# Patient Record
Sex: MALE | Race: WHITE | Employment: UNEMPLOYED | URBAN - METROPOLITAN AREA
[De-identification: names, ages, dates, MRNs, and addresses within clinical notes are randomized per-mention and may not be internally consistent; named-entity substitution may affect disease eponyms.]

---

## 2019-01-22 ENCOUNTER — APPOINTMENT (OUTPATIENT)
Dept: GENERAL RADIOLOGY | Age: 44
End: 2019-01-22
Payer: COMMERCIAL

## 2019-01-22 ENCOUNTER — HOSPITAL ENCOUNTER (EMERGENCY)
Age: 44
Discharge: HOME | End: 2019-01-22
Attending: EMERGENCY MEDICINE
Payer: COMMERCIAL

## 2019-01-22 VITALS
HEART RATE: 82 BPM | RESPIRATION RATE: 12 BRPM | HEIGHT: 72 IN | TEMPERATURE: 97.9 F | SYSTOLIC BLOOD PRESSURE: 136 MMHG | WEIGHT: 180 LBS | OXYGEN SATURATION: 100 % | DIASTOLIC BLOOD PRESSURE: 78 MMHG | BODY MASS INDEX: 24.38 KG/M2

## 2019-01-22 DIAGNOSIS — S93.402A SPRAIN OF LEFT ANKLE, UNSPECIFIED LIGAMENT, INITIAL ENCOUNTER: Primary | ICD-10-CM

## 2019-01-22 PROCEDURE — 73610 X-RAY EXAM OF ANKLE: CPT

## 2019-01-22 PROCEDURE — 99283 EMERGENCY DEPT VISIT LOW MDM: CPT

## 2019-01-22 RX ORDER — IBUPROFEN 600 MG/1
600 TABLET ORAL EVERY 6 HOURS PRN
Qty: 40 TABLET | Refills: 0 | Status: SHIPPED | OUTPATIENT
Start: 2019-01-22 | End: 2021-07-27

## 2019-01-22 ASSESSMENT — PAIN DESCRIPTION - DESCRIPTORS: DESCRIPTORS: DISCOMFORT

## 2019-01-22 ASSESSMENT — PAIN DESCRIPTION - ORIENTATION: ORIENTATION: LEFT

## 2019-01-22 ASSESSMENT — PAIN DESCRIPTION - PAIN TYPE: TYPE: ACUTE PAIN;CHRONIC PAIN

## 2019-01-22 ASSESSMENT — PAIN DESCRIPTION - LOCATION: LOCATION: ANKLE

## 2019-09-11 ENCOUNTER — HOSPITAL ENCOUNTER (EMERGENCY)
Age: 44
Discharge: HOME | End: 2019-09-11
Attending: EMERGENCY MEDICINE
Payer: COMMERCIAL

## 2019-09-11 VITALS
WEIGHT: 185.4 LBS | SYSTOLIC BLOOD PRESSURE: 115 MMHG | TEMPERATURE: 98 F | HEART RATE: 88 BPM | RESPIRATION RATE: 17 BRPM | OXYGEN SATURATION: 96 % | DIASTOLIC BLOOD PRESSURE: 88 MMHG | HEIGHT: 71 IN | BODY MASS INDEX: 25.96 KG/M2

## 2019-09-11 DIAGNOSIS — Z51.89 VISIT FOR WOUND CHECK: Primary | ICD-10-CM

## 2019-09-11 PROCEDURE — 99282 EMERGENCY DEPT VISIT SF MDM: CPT

## 2019-09-11 RX ORDER — ACETAMINOPHEN 500 MG
1000 TABLET ORAL EVERY 6 HOURS PRN
Qty: 30 TABLET | Refills: 1 | OUTPATIENT
Start: 2019-09-11 | End: 2021-07-27

## 2019-09-11 RX ORDER — CYCLOBENZAPRINE HCL 10 MG
10 TABLET ORAL 3 TIMES DAILY PRN
COMMUNITY
End: 2021-07-27

## 2019-09-11 ASSESSMENT — PAIN DESCRIPTION - LOCATION: LOCATION: ARM

## 2019-09-11 ASSESSMENT — PAIN SCALES - GENERAL: PAINLEVEL_OUTOF10: 8

## 2019-09-11 ASSESSMENT — PAIN DESCRIPTION - ORIENTATION: ORIENTATION: RIGHT

## 2019-09-11 ASSESSMENT — PAIN DESCRIPTION - PAIN TYPE: TYPE: ACUTE PAIN

## 2019-09-11 NOTE — ED TRIAGE NOTES
Pt seen at [facility] on 9/9 for laceration to right forearm. Here today due to laceration oozing. Serous fluid noted at wound site, with small amount of swelling to wound. Sutures intact. No redness or purlulent drainage noted. Some bruising to upper arm which pt states was from tourniquet.

## 2020-05-28 ENCOUNTER — HOSPITAL ENCOUNTER (EMERGENCY)
Age: 45
Discharge: HOME | End: 2020-05-28
Attending: EMERGENCY MEDICINE
Payer: COMMERCIAL

## 2020-05-28 VITALS
SYSTOLIC BLOOD PRESSURE: 126 MMHG | WEIGHT: 185 LBS | TEMPERATURE: 99.4 F | OXYGEN SATURATION: 95 % | HEIGHT: 71 IN | DIASTOLIC BLOOD PRESSURE: 74 MMHG | HEART RATE: 86 BPM | BODY MASS INDEX: 25.9 KG/M2 | RESPIRATION RATE: 16 BRPM

## 2020-05-28 PROCEDURE — 99283 EMERGENCY DEPT VISIT LOW MDM: CPT

## 2020-05-28 RX ORDER — LIDOCAINE 50 MG/G
1 PATCH TOPICAL DAILY
Qty: 15 PATCH | Refills: 1 | Status: SHIPPED | OUTPATIENT
Start: 2020-05-28 | End: 2020-06-12

## 2020-05-28 NOTE — ED NOTES
Pt is now admitting to taking pain medication that he gets the on the street. Pt states that he does not know the name of the pain medication. Pt states he does not want to be admitted for treatment.       Claudetta Angelucci, RN  05/28/20 8442

## 2020-05-28 NOTE — ED PROVIDER NOTES
TRIAGE CHIEF COMPLAINT:   Chief Complaint   Patient presents with    Drug Overdose         HPI: Prachi Bravo is a 40 y.o. male who presents to the Emergency Department with complaint of medication overdose. Patient was stopped by the police for suspicion of DUI. He initially denied taking any medications. He later told them that he injected an unknown drug. The life squad was called. The patient states he was told he could either be arrested or come here to the hospital.  He tells me now that he took an unknown narcotic medication he denies alcohol use. Denies any intention to hurt himself. Patient states he has a history of a back fracture in 2016 or 2017 after he fell off a roof. He has had chronic pain since. He states he is not taking any current prescription medications for his back pain. He denies bowel or bladder complaint. No numbness or weakness. No recent injury. Given to him by someone on the street. The patient states he does have a place to live. REVIEW OF SYSTEMS:  6 systems reviewed. Pertinent positives per HPI. Otherwise noted to be negative. Nursing notes reviewed and agree with above. Past medical/surgical history reviewed.     MEDICATIONS   Patient's Medications   New Prescriptions    No medications on file   Previous Medications    ACETAMINOPHEN (APAP EXTRA STRENGTH) 500 MG TABLET    Take 2 tablets by mouth every 6 hours as needed for Pain    CYCLOBENZAPRINE (FLEXERIL) 10 MG TABLET    Take 10 mg by mouth 3 times daily as needed for Muscle spasms    HYDROCODONE-ACETAMINOPHEN (NORCO)  MG PER TABLET    Take 1 tablet by mouth every 6 hours as needed for Pain    HYDROXYZINE HCL PO    Take by mouth    IBUPROFEN (IBU) 600 MG TABLET    Take 1 tablet by mouth every 6 hours as needed for Pain   Modified Medications    No medications on file   Discontinued Medications    No medications on file         ALLERGIES No Known Allergies      /78   Pulse 90   Temp 99.4 °F (37.4 °C) Department, diagnosis, care, prognosis and the importance of follow-up. The patient is stable for discharge. The patient and/or family are in agreement with the plan and all questions have been answered. Specific discharge instructions were explained, including reasons to return to the emergency department.       (Please note that portions of this note may have been completed with a voice recognition program.  Efforts were made to edit the dictation but occasionally words are mis-transcribed)        FINAL IMPRESSION:  1 --accidental narcotic overdose  2 --chronic back pain                   Caroline Hsu MD  05/30/20 7303

## 2021-07-27 ENCOUNTER — HOSPITAL ENCOUNTER (EMERGENCY)
Age: 46
Discharge: HOME | End: 2021-07-27
Attending: EMERGENCY MEDICINE
Payer: COMMERCIAL

## 2021-07-27 ENCOUNTER — APPOINTMENT (OUTPATIENT)
Dept: GENERAL RADIOLOGY | Age: 46
End: 2021-07-27
Payer: COMMERCIAL

## 2021-07-27 VITALS
HEART RATE: 89 BPM | SYSTOLIC BLOOD PRESSURE: 124 MMHG | HEIGHT: 72 IN | DIASTOLIC BLOOD PRESSURE: 83 MMHG | WEIGHT: 126 LBS | OXYGEN SATURATION: 96 % | BODY MASS INDEX: 17.07 KG/M2 | TEMPERATURE: 98.2 F | RESPIRATION RATE: 16 BRPM

## 2021-07-27 DIAGNOSIS — S93.401A SPRAIN OF RIGHT ANKLE, UNSPECIFIED LIGAMENT, INITIAL ENCOUNTER: Primary | ICD-10-CM

## 2021-07-27 PROCEDURE — 73610 X-RAY EXAM OF ANKLE: CPT

## 2021-07-27 PROCEDURE — 99285 EMERGENCY DEPT VISIT HI MDM: CPT

## 2021-07-27 PROCEDURE — 6370000000 HC RX 637 (ALT 250 FOR IP): Performed by: EMERGENCY MEDICINE

## 2021-07-27 RX ORDER — IBUPROFEN 400 MG/1
400 TABLET ORAL EVERY 8 HOURS PRN
Qty: 20 TABLET | Refills: 0 | Status: SHIPPED | OUTPATIENT
Start: 2021-07-27 | End: 2021-09-27

## 2021-07-27 RX ORDER — IBUPROFEN 400 MG/1
400 TABLET ORAL ONCE
Status: COMPLETED | OUTPATIENT
Start: 2021-07-27 | End: 2021-07-27

## 2021-07-27 RX ADMIN — IBUPROFEN 400 MG: 400 TABLET, FILM COATED ORAL at 07:33

## 2021-07-27 ASSESSMENT — PAIN SCALES - GENERAL: PAINLEVEL_OUTOF10: 0

## 2021-07-27 NOTE — ED PROVIDER NOTES
TRIAGE CHIEF COMPLAINT:   Chief Complaint   Patient presents with    Ankle Injury     approx. 3am today, pt sts stood up and right ankle twisted. reports pain only with wgt bearing. no deformity. no swelling         HPI: Makenzie Tyson is a 39 y.o. male who presents to the Emergency Department with complaint of right ankle pain. Patient states around 3 AM this morning he stood up and his right ankle twisted. He denies any pain at rest but states he has pain when he bears weight. Denies knee or hip pain. No other injury. The patient apparently has a history of left dropfoot related to a previous tibial plateau fracture. He has a history of homelessness, chronic back pain, chronic hepatitis C and narcotic overdose. He was released from [facility] about 2 days ago. Yesterday he was seen at [facility] DR ROGELIO ARCHIBALD after a narcotic overdose and woke up after Narcan 4 mg intranasally. REVIEW OF SYSTEMS:  6 systems reviewed. Pertinent positives per HPI. Otherwise noted to be negative. Nursing notes reviewed and agree with above. Past medical/surgical history reviewed.     MEDICATIONS   Patient's Medications   New Prescriptions    No medications on file   Previous Medications    ACETAMINOPHEN (APAP EXTRA STRENGTH) 500 MG TABLET    Take 2 tablets by mouth every 6 hours as needed for Pain    CYCLOBENZAPRINE (FLEXERIL) 10 MG TABLET    Take 10 mg by mouth 3 times daily as needed for Muscle spasms    HYDROCODONE-ACETAMINOPHEN (NORCO)  MG PER TABLET    Take 1 tablet by mouth every 6 hours as needed for Pain    HYDROXYZINE HCL PO    Take by mouth    IBUPROFEN (IBU) 600 MG TABLET    Take 1 tablet by mouth every 6 hours as needed for Pain   Modified Medications    No medications on file   Discontinued Medications    No medications on file         ALLERGIES No Known Allergies      /83   Pulse 89   Temp 98.2 °F (36.8 °C) (Oral)   Resp 16   Ht 6' (1.829 m)   Wt 126 lb (57.2 kg)   SpO2 96%   BMI 17.09 kg/m²   General:  No acute distress. Non toxic appearance  Head:   Normocephalic and atraumatic  Eyes:   Conjunctiva clear, CARLOS ENRIQUE, EOM's intact. ENT:   Mucous membranes moist  Neck:   Supple. No adenopathy. Lungs/Chest:  No respiratory distress  CVS:   Regular rate and rhythm  Extremities:  Examination of the right ankle shows no deformity or obvious swelling. Range of motion is decreased secondary to pain. Distal neurovascular exam is intact. There is some inconsistent bilateral ankle tenderness. Foot is nontender. Pedal pulses are palpable and equal. No calf or knee tenderness. Skin:   No rashes or lesions to exposed skin  Neuro:  Alert and OX3. Speech clear and appropriate. No extremity weakness. Normal sensation in all extremities. No facial asymmetry. Gait normal.  Psych:   Affect normal. Mood normal        RADIOLOGY  XR ANKLE RIGHT (MIN 3 VIEWS)   Final Result   Impression: No acute abnormality. LAB      ED COURSE / MDM:  68-year-old male with history of homelessness, chronic hepatitis C, chronic back pain, left foot drop and history of narcotic overdose who presents by life squad with complaints of right ankle pain after he stood up and twisted it this morning at 3 AM. No deformity. Mild inconsistent bilateral ankle tenderness noted. Distal neurovascular exams intact. No foot tenderness calf or knee tenderness. X-rays of the right ankle read to the radiologist and reviewed by myself shows no fracture or acute bony abnormality. Patient was given a dose of ibuprofen and an ankle brace. He is able to bear weight. He was given food and beverages here. He declines transfer to a homeless shelter at this time but states he would like a list of shelters if he changes his mind. I discussed with Jermaine Evans the results of evaluation in the Emergency Department, diagnosis, care and prognosis. The plan is to discharge to home. The patient is in agreement with the plan and questions have been answered.  I also discussed with the patient and/or family the reasons which may require a return visit and the importance of follow-up care.        (Please note that portions of this note may have been completed with a voice recognition program.  Efforts were made to edit the dictation but occasionally words are mis-transcribed)        FINAL IMPRESSION:  1 --right ankle sprain     Chuck Aguirre MD  07/27/21 [id number]

## 2021-09-27 ENCOUNTER — APPOINTMENT (OUTPATIENT)
Dept: CT IMAGING | Age: 46
End: 2021-09-27
Payer: COMMERCIAL

## 2021-09-27 ENCOUNTER — HOSPITAL ENCOUNTER (EMERGENCY)
Age: 46
Discharge: HOME | End: 2021-09-27
Attending: EMERGENCY MEDICINE
Payer: COMMERCIAL

## 2021-09-27 VITALS
WEIGHT: 160 LBS | DIASTOLIC BLOOD PRESSURE: 64 MMHG | OXYGEN SATURATION: 97 % | BODY MASS INDEX: 21.7 KG/M2 | RESPIRATION RATE: 18 BRPM | TEMPERATURE: 98.4 F | HEART RATE: 85 BPM | SYSTOLIC BLOOD PRESSURE: 112 MMHG

## 2021-09-27 DIAGNOSIS — G89.29 CHRONIC LOW BACK PAIN, UNSPECIFIED BACK PAIN LATERALITY, UNSPECIFIED WHETHER SCIATICA PRESENT: Primary | ICD-10-CM

## 2021-09-27 DIAGNOSIS — M54.50 CHRONIC LOW BACK PAIN, UNSPECIFIED BACK PAIN LATERALITY, UNSPECIFIED WHETHER SCIATICA PRESENT: Primary | ICD-10-CM

## 2021-09-27 PROCEDURE — 96372 THER/PROPH/DIAG INJ SC/IM: CPT

## 2021-09-27 PROCEDURE — 72131 CT LUMBAR SPINE W/O DYE: CPT

## 2021-09-27 PROCEDURE — 6360000002 HC RX W HCPCS: Performed by: EMERGENCY MEDICINE

## 2021-09-27 PROCEDURE — 99285 EMERGENCY DEPT VISIT HI MDM: CPT

## 2021-09-27 PROCEDURE — 6370000000 HC RX 637 (ALT 250 FOR IP): Performed by: EMERGENCY MEDICINE

## 2021-09-27 PROCEDURE — 72128 CT CHEST SPINE W/O DYE: CPT

## 2021-09-27 RX ORDER — METHOCARBAMOL 500 MG/1
500 TABLET, FILM COATED ORAL ONCE
Status: COMPLETED | OUTPATIENT
Start: 2021-09-27 | End: 2021-09-27

## 2021-09-27 RX ORDER — PREDNISONE 20 MG/1
40 TABLET ORAL DAILY
Qty: 10 TABLET | Refills: 0 | Status: SHIPPED | OUTPATIENT
Start: 2021-09-27 | End: 2021-10-02

## 2021-09-27 RX ORDER — KETOROLAC TROMETHAMINE 30 MG/ML
30 INJECTION, SOLUTION INTRAMUSCULAR; INTRAVENOUS ONCE
Status: COMPLETED | OUTPATIENT
Start: 2021-09-27 | End: 2021-09-27

## 2021-09-27 RX ORDER — METHOCARBAMOL 500 MG/1
500 TABLET, FILM COATED ORAL 4 TIMES DAILY
Qty: 40 TABLET | Refills: 0 | Status: SHIPPED | OUTPATIENT
Start: 2021-09-27 | End: 2021-10-07

## 2021-09-27 RX ORDER — IBUPROFEN 600 MG/1
600 TABLET ORAL 3 TIMES DAILY PRN
Qty: 30 TABLET | Refills: 0 | Status: SHIPPED | OUTPATIENT
Start: 2021-09-27

## 2021-09-27 RX ORDER — HYDROCODONE BITARTRATE AND ACETAMINOPHEN 5; 325 MG/1; MG/1
2 TABLET ORAL ONCE
Status: COMPLETED | OUTPATIENT
Start: 2021-09-27 | End: 2021-09-27

## 2021-09-27 RX ORDER — PREDNISONE 20 MG/1
40 TABLET ORAL ONCE
Status: COMPLETED | OUTPATIENT
Start: 2021-09-27 | End: 2021-09-27

## 2021-09-27 RX ADMIN — PREDNISONE 40 MG: 20 TABLET ORAL at 07:42

## 2021-09-27 RX ADMIN — METHOCARBAMOL 500 MG: 500 TABLET ORAL at 08:22

## 2021-09-27 RX ADMIN — HYDROCODONE BITARTRATE AND ACETAMINOPHEN 2 TABLET: 5; 325 TABLET ORAL at 07:42

## 2021-09-27 RX ADMIN — KETOROLAC TROMETHAMINE 30 MG: 30 INJECTION, SOLUTION INTRAMUSCULAR; INTRAVENOUS at 06:03

## 2021-09-27 ASSESSMENT — PAIN SCALES - GENERAL
PAINLEVEL_OUTOF10: 10
PAINLEVEL_OUTOF10: 10
PAINLEVEL_OUTOF10: 9

## 2021-09-27 ASSESSMENT — PAIN DESCRIPTION - ORIENTATION: ORIENTATION: LEFT

## 2021-09-27 ASSESSMENT — PAIN DESCRIPTION - PAIN TYPE: TYPE: CHRONIC PAIN

## 2021-09-27 ASSESSMENT — PAIN DESCRIPTION - LOCATION: LOCATION: HIP

## 2021-09-27 ASSESSMENT — PAIN DESCRIPTION - DESCRIPTORS: DESCRIPTORS: STABBING;BURNING

## 2021-09-27 NOTE — ED PROVIDER NOTES
CHIEF COMPLAINT  Back Pain (Pt with chronic Left hip/back/knee/leg pain. States he was helping someone with eliud this past week. Pt states homeless. ), Leg Pain, and Hip Pain      HISTORY OF PRESENT ILLNESS  Alejandro Messer is a 39 y.o. male with a history of homelessness, chronic knee pain, back pain, narcotic overdose who presents emergency department for evaluation of back pain. Patient states that he has had left hip, back, knee and leg pain for the past several years. He states that he was helping someone with installing eliud this past week may have exacerbated this pain. He denies any falls or traumatic injuries. Per documentation, he has chronic left foot drop. He has recently seen an orthopedic surgeon for his knee pain and he may require a knee replacement at some point in time. He has follow-up scheduled with the spine surgeon within the next week. He denies any fevers, new weakness, numbness, bowel or bladder incontinence. He states the pain is worse when he rotates his trunk. .   No other complaints, modifying factors or associated symptoms. I have reviewed the following from the nursing documentation. Past Medical History:   Diagnosis Date    [facility] disorder ([facility]     Diabetes mellitus ([facility]     Hepatitis C     Homeless     Knee pain     Osteoarthritis     Overdose     Pneumonia     Sciatica      Past Surgical History:   Procedure Laterality Date    FRACTURE SURGERY      KNEE SURGERY      TIBIA FRACTURE SURGERY       History reviewed. No pertinent family history.   Social History     Socioeconomic History    Marital status:      Spouse name: Not on file    Number of children: Not on file    Years of education: Not on file    Highest education level: Not on file   Occupational History    Not on file   Tobacco Use    Smoking status: Current Every Day Smoker     Packs/day: 0.50     Types: Cigarettes    Smokeless tobacco: Never Used   Substance and Sexual Activity    Alcohol use: No    Drug use: Not Currently    Sexual activity: Not on file   Other Topics Concern    Not on file   Social History Narrative    Not on file     Social Determinants of Health     Financial Resource Strain:     Difficulty of Paying Living Expenses:    Food Insecurity:     Worried About Running Out of Food in the Last Year:     [address] in the Last Year:    Transportation Needs:     Lack of Transportation (Medical):  Lack of Transportation (Non-Medical):    Physical Activity:     Days of Exercise per Week:     Minutes of Exercise per Session:    Stress:     Feeling of Stress :    Social Connections:     Frequency of Communication with Friends and Family:     Frequency of Social Gatherings with Friends and Family:     Attends [employer] Services:     Active Member of Clubs or Organizations:     Attends Club or Organization Meetings:     Marital Status:    Intimate Partner Violence:     Fear of Current or Ex-Partner:     Emotionally Abused:     Physically Abused:     Sexually Abused:      No current facility-administered medications for this encounter. Current Outpatient Medications   Medication Sig Dispense Refill    methocarbamol (ROBAXIN) 500 MG tablet Take 1 tablet by mouth 4 times daily for 10 days 40 tablet 0    ibuprofen (ADVIL;MOTRIN) 600 MG tablet Take 1 tablet by mouth 3 times daily as needed for Pain 30 tablet 0    predniSONE (DELTASONE) 20 MG tablet Take 2 tablets by mouth daily for 5 days 10 tablet 0     No Known Allergies    REVIEW OF SYSTEMS  10 systems reviewed, pertinent positives per HPI otherwise noted to be negative. PHYSICAL EXAM  /64   Pulse 85   Temp 98.4 °F (36.9 °C) (Oral)   Resp 18   Wt 160 lb (72.6 kg)   SpO2 97%   BMI 21.70 kg/m²   GENERAL APPEARANCE: Awake and alert. Cooperative. No acute distress. Disheveled. HEAD: Normocephalic. Atraumatic. There is no midline C-spine tenderness  EYES: PERRL. EOM's grossly intact.   ENT: Mucous membranes are moist.   NECK: Supple, trachea midline. No significant lymphadenopathy  HEART: RRR. No harsh murmurs. Intact radial pulses 2+ bilaterally. LUNGS: Respirations unlabored without accessory muscle use. Speaking comfortably in full sentences. ABDOMEN: Soft. Non-distended. Non-tender. No guarding or rebound. EXTREMITIES: No peripheral edema. No acute deformities. SKIN: Warm and dry. No acute rashes. NEUROLOGICAL: Alert and oriented X 3. Left foot drop. Intact sensation to light touch in the bilateral lower extremities. Gait is antalgic with use of a cane, which is normal for him. PSYCHIATRIC: Normal mood and affect. LABS  I have reviewed all labs for this visit. No results found for this visit on 09/27/21. RADIOLOGY  X-RAYS:  I have reviewed radiologic plain film image(s). ALL OTHER NON-PLAIN FILM IMAGES SUCH AS CT, ULTRASOUND AND MRI HAVE BEEN READ BY THE RADIOLOGIST. CT THORACIC SPINE WO CONTRAST   Final Result      1. Age-indeterminate moderate compression deformity of T12 with approximately 50% loss of height anteriorly. 2. Age-indeterminate mild compression deformity of L1 with approximately 30% loss of height anteriorly. 3. Multilevel mild degenerative changes in the thoracic and lumbar spine as above. 4. Broad-based central disc protrusion at L5-S1 abutting ventral thecal sac and bilateral descending S1 nerve roots. 5. Mild dextro convex scoliosis of thoracic spine. CT LUMBAR SPINE WO CONTRAST   Final Result      1. Age-indeterminate moderate compression deformity of T12 with approximately 50% loss of height anteriorly. 2. Age-indeterminate mild compression deformity of L1 with approximately 30% loss of height anteriorly. 3. Multilevel mild degenerative changes in the thoracic and lumbar spine as above. 4. Broad-based central disc protrusion at L5-S1 abutting ventral thecal sac and bilateral descending S1 nerve roots.    5. Mild dextro convex scoliosis of thoracic spine. ED COURSE/MDM  Patient seen and evaluated. Old records reviewed. Labs and imaging reviewed and results discussed with patient. This is a 80-year-old male who presents emergency department for evaluation of acute on chronic low back pain, leg pain. Patient arrives with stable vital signs. On exam, he has no focal neurological deficits, intact sensation to light touch. He has no symptoms of spinal epidural compression syndrome. No new traumatic injury. ED evaluation will include CT of the lumbar and thoracic spine to evaluate for new destructive lesions versus new compression fractures. Patient will be given Toradol, Norco, prednisone for management of his pain. Because of his history of narcotic abuse and overdose, I am hesitant to prescribe narcotic medication. He does have follow-up with spine surgery within the next week and he should keep this appointment for further evaluation. CT of the thoracic and lumbar spine shows old compression fractures that are previously known. He does have central disc protrusion at L5-S1 that fits with his symptoms of sciatica. Again, patient has no red flag symptoms or symptoms concerning for spinal epidural compression syndrome. Patient will be discharged with prescriptions of Robaxin, ibuprofen, steroid course and advised to keep his appointment with spinal surgery. He expressed understanding. Patient was able to eat a meal here. The patient will be discharged from the emergency department. The patient was counseled on their diagnosis and any medications prescribed. They were advised on the need for PCP followup. They were counseled on the need to return to the emergency department if any of their symptoms were to worsen, change or have any other concerns. Discharged in stable condition. Patient was given scripts for the following medications. I counseled patient how to take these medications.    New Prescriptions    IBUPROFEN (ADVIL;MOTRIN) 600 MG TABLET    Take 1 tablet by mouth 3 times daily as needed for Pain    METHOCARBAMOL (ROBAXIN) 500 MG TABLET    Take 1 tablet by mouth 4 times daily for 10 days    PREDNISONE (DELTASONE) 20 MG TABLET    Take 2 tablets by mouth daily for 5 days       CLINICAL IMPRESSION  1. Chronic low back pain, unspecified back pain laterality, unspecified whether sciatica present        Blood pressure 112/64, pulse 85, temperature 98.4 °F (36.9 °C), temperature source Oral, resp. rate 18, weight 160 lb (72.6 kg), SpO2 97 %. Rahat Bernal was discharged to home in stable condition. This chart was generated in part by using Dragon Dictation system and may contain errors related to that system including errors in grammar, punctuation, and spelling, as well as words and phrases that may be inappropriate. If there are any questions or concerns please feel free to contact the dictating provider for clarification.      Venecia Hernández MD  09/27/21 7372

## 2021-09-27 NOTE — ED NOTES
Pt transferred to w/c from bed without assistance needed. Lyft transport set up.      Dong Shah RN  09/27/21 0615

## 2021-09-27 NOTE — ED NOTES
Pt given meal. Requesting cab ride at time of discharge.      Victor Manuel Andrade RN  09/27/21 8519

## 2024-04-28 ENCOUNTER — HOSPITAL ENCOUNTER (EMERGENCY)
Age: 49
Discharge: HOME | End: 2024-04-28
Attending: EMERGENCY MEDICINE
Payer: MEDICAID

## 2024-04-28 VITALS
DIASTOLIC BLOOD PRESSURE: 83 MMHG | SYSTOLIC BLOOD PRESSURE: 140 MMHG | HEART RATE: 88 BPM | RESPIRATION RATE: 18 BRPM | BODY MASS INDEX: 21.7 KG/M2 | WEIGHT: 160 LBS | TEMPERATURE: 98.2 F | OXYGEN SATURATION: 100 %

## 2024-04-28 DIAGNOSIS — R53.83 OTHER FATIGUE: Primary | ICD-10-CM

## 2024-04-28 LAB
ALBUMIN SERPL-MCNC: 4.2 G/DL (ref 3.4–5)
ALBUMIN/GLOB SERPL: 1.6 {RATIO} (ref 1.1–2.2)
ALP SERPL-CCNC: 68 U/L (ref 40–129)
ALT SERPL-CCNC: 14 U/L (ref 10–40)
ANION GAP SERPL CALCULATED.3IONS-SCNC: 12 MMOL/L (ref 3–16)
AST SERPL-CCNC: 18 U/L (ref 15–37)
BASOPHILS # BLD: 0.1 K/UL (ref 0–0.2)
BASOPHILS NFR BLD: 0.7 %
BILIRUB SERPL-MCNC: 0.3 MG/DL (ref 0–1)
BUN SERPL-MCNC: 11 MG/DL (ref 7–20)
CALCIUM SERPL-MCNC: 8.9 MG/DL (ref 8.3–10.6)
CHLORIDE SERPL-SCNC: 105 MMOL/L (ref 99–110)
CK SERPL-CCNC: 179 U/L (ref 39–308)
CO2 SERPL-SCNC: 23 MMOL/L (ref 21–32)
CREAT SERPL-MCNC: 0.7 MG/DL (ref 0.9–1.3)
DEPRECATED RDW RBC AUTO: 15.5 % (ref 12.4–15.4)
EOSINOPHIL # BLD: 0.1 K/UL (ref 0–0.6)
EOSINOPHIL NFR BLD: 1.7 %
GFR SERPLBLD CREATININE-BSD FMLA CKD-EPI: >90 ML/MIN/{1.73_M2}
GLUCOSE SERPL-MCNC: 93 MG/DL (ref 70–99)
HCT VFR BLD AUTO: 35.4 % (ref 40.5–52.5)
HGB BLD-MCNC: 11.9 G/DL (ref 13.5–17.5)
LYMPHOCYTES # BLD: 2.2 K/UL (ref 1–5.1)
LYMPHOCYTES NFR BLD: 29.1 %
MCH RBC QN AUTO: 29.5 PG (ref 26–34)
MCHC RBC AUTO-ENTMCNC: 33.5 G/DL (ref 31–36)
MCV RBC AUTO: 88 FL (ref 80–100)
MONOCYTES # BLD: 0.5 K/UL (ref 0–1.3)
MONOCYTES NFR BLD: 7.3 %
NEUTROPHILS # BLD: 4.6 K/UL (ref 1.7–7.7)
NEUTROPHILS NFR BLD: 61.2 %
PLATELET # BLD AUTO: 294 K/UL (ref 135–450)
PMV BLD AUTO: 7.7 FL (ref 5–10.5)
POTASSIUM SERPL-SCNC: 3.8 MMOL/L (ref 3.5–5.1)
PROT SERPL-MCNC: 6.8 G/DL (ref 6.4–8.2)
RBC # BLD AUTO: 4.03 M/UL (ref 4.2–5.9)
SODIUM SERPL-SCNC: 140 MMOL/L (ref 136–145)
WBC # BLD AUTO: 7.5 K/UL (ref 4–11)

## 2024-04-28 PROCEDURE — 85025 COMPLETE CBC W/AUTO DIFF WBC: CPT

## 2024-04-28 PROCEDURE — 99283 EMERGENCY DEPT VISIT LOW MDM: CPT

## 2024-04-28 PROCEDURE — 80053 COMPREHEN METABOLIC PANEL: CPT

## 2024-04-28 PROCEDURE — 82550 ASSAY OF CK (CPK): CPT

## 2024-04-28 ASSESSMENT — PAIN - FUNCTIONAL ASSESSMENT: PAIN_FUNCTIONAL_ASSESSMENT: 0-10

## 2024-04-28 ASSESSMENT — PAIN SCALES - GENERAL: PAINLEVEL_OUTOF10: 5

## 2024-04-28 ASSESSMENT — PAIN DESCRIPTION - LOCATION: LOCATION: OTHER (COMMENT)

## 2024-04-29 NOTE — ED NOTES
Pt was offered a cab to the [facility], but declined. 2 IVs removed from right and left forearms.

## 2024-04-29 NOTE — ED PROVIDER NOTES
EMERGENCY DEPARTMENT ENCOUNTER     [facility] EMERGENCY DEPARTMENT     Pt Name: Bonifacio Wolfe   MRN: [id number]   Birthdate 1975   Date of evaluation: 4/28/2024   Provider: Meghan Stone MD   PCP: No primary care provider on file.   Note Started: 9:32 PM EDT 4/28/24     CHIEF COMPLAINT     Chief Complaint   Patient presents with    Fatigue     Pt states he feels weak like he was going to pass out. Just got off a 40 hour bus ride from [place], [place]. Pt is homeless. Pt states he was admitted twice to hospitals in [place] for dehydration, trench foot, and bladder cancer.        HISTORY OF PRESENT ILLNESS:          Bonifacio Wolfe is a 48 y.o. male who presents with a chief complaint of dehydration. He thinks it from being in [place] for the past month. 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 No vomiting, he has been having some watery stools. He thinks he is dehydrated because he feels weak and thirsty even if he has been trying to drink water.       Nursing Notes were all reviewed and agreed with or any disagreements were addressed in the HPI.     ROS: Positives and Pertinent negatives as per HPI.    PAST MEDICAL HISTORY     Past medical history:  has a past medical history of Affective disorder (HCC), Diabetes mellitus (HCC), Hepatitis C, Homeless, Knee pain, Osteoarthritis, Overdose, Pneumonia, and Sciatica.    Past surgical history:  has a past surgical history that includes fracture surgery; knee surgery; and Tibia fracture surgery.      PHYSICAL EXAM:  ED Triage Vitals [04/28/24 2059]   BP Temp Temp Source Pulse Respirations SpO2 Height Weight - Scale   (!) 140/83 98.2 °F (36.8 °C) Oral 88 18 100 % -- 72.6 kg (160 lb)        Physical Exam  Vitals and nursing note reviewed.   Constitutional:       Appearance: Normal appearance. He is well-developed. He is not ill-appearing.      Comments: Slightly unkempt adult male in no acute distress   HENT:      Head: Normocephalic and atraumatic.      Right Ear:

## 2024-08-16 ENCOUNTER — HOSPITAL ENCOUNTER (INPATIENT)
Age: 49
LOS: 1 days | Discharge: LEFT BEFORE DISCHARGE | DRG: 812 | End: 2024-08-17
Attending: EMERGENCY MEDICINE | Admitting: INTERNAL MEDICINE
Payer: MEDICAID

## 2024-08-16 ENCOUNTER — APPOINTMENT (OUTPATIENT)
Dept: CT IMAGING | Age: 49
DRG: 812 | End: 2024-08-16
Payer: MEDICAID

## 2024-08-16 ENCOUNTER — APPOINTMENT (OUTPATIENT)
Dept: GENERAL RADIOLOGY | Age: 49
DRG: 812 | End: 2024-08-16
Payer: MEDICAID

## 2024-08-16 DIAGNOSIS — D50.9 MICROCYTIC ANEMIA: ICD-10-CM

## 2024-08-16 DIAGNOSIS — G93.40 ENCEPHALOPATHY: Primary | ICD-10-CM

## 2024-08-16 DIAGNOSIS — F19.90 SUBSTANCE USE: ICD-10-CM

## 2024-08-16 DIAGNOSIS — R00.1 BRADYCARDIA: ICD-10-CM

## 2024-08-16 PROBLEM — R41.82 AMS (ALTERED MENTAL STATUS): Status: ACTIVE | Noted: 2024-08-16

## 2024-08-16 LAB
ALBUMIN SERPL-MCNC: 4.3 G/DL (ref 3.4–5)
ALBUMIN/GLOB SERPL: 1.2 {RATIO} (ref 1.1–2.2)
ALP SERPL-CCNC: 93 U/L (ref 40–129)
ALT SERPL-CCNC: <5 U/L (ref 10–40)
AMPHETAMINES UR QL SCN>1000 NG/ML: ABNORMAL
ANION GAP SERPL CALCULATED.3IONS-SCNC: 13 MMOL/L (ref 3–16)
AST SERPL-CCNC: 15 U/L (ref 15–37)
BARBITURATES UR QL SCN>200 NG/ML: ABNORMAL
BASOPHILS # BLD: 0.1 K/UL (ref 0–0.2)
BASOPHILS NFR BLD: 0.9 %
BENZODIAZ UR QL SCN>200 NG/ML: ABNORMAL
BILIRUB SERPL-MCNC: <0.2 MG/DL (ref 0–1)
BUN SERPL-MCNC: 11 MG/DL (ref 7–20)
CALCIUM SERPL-MCNC: 9.1 MG/DL (ref 8.3–10.6)
CANNABINOIDS UR QL SCN>50 NG/ML: POSITIVE
CHLORIDE SERPL-SCNC: 101 MMOL/L (ref 99–110)
CO2 SERPL-SCNC: 21 MMOL/L (ref 21–32)
COCAINE UR QL SCN: POSITIVE
CREAT SERPL-MCNC: 0.7 MG/DL (ref 0.9–1.3)
DEPRECATED RDW RBC AUTO: 17.1 % (ref 12.4–15.4)
DRUG SCREEN COMMENT UR-IMP: ABNORMAL
EKG DIAGNOSIS: NORMAL
EKG Q-T INTERVAL: 472 MS
EKG QRS DURATION: 92 MS
EKG QTC CALCULATION (BAZETT): 413 MS
EKG R AXIS: 53 DEGREES
EKG T AXIS: 51 DEGREES
EKG VENTRICULAR RATE: 46 BPM
EOSINOPHIL # BLD: 0.2 K/UL (ref 0–0.6)
EOSINOPHIL NFR BLD: 2 %
ETHANOLAMINE SERPL-MCNC: 105 MG/DL (ref 0–0.08)
FENTANYL SCREEN, URINE: POSITIVE
GFR SERPLBLD CREATININE-BSD FMLA CKD-EPI: >90 ML/MIN/{1.73_M2}
GLUCOSE BLD-MCNC: 203 MG/DL (ref 70–99)
GLUCOSE SERPL-MCNC: 174 MG/DL (ref 70–99)
HCT VFR BLD AUTO: 28.6 % (ref 40.5–52.5)
HEMOCCULT SP1 STL QL: NORMAL
HGB BLD-MCNC: 8.9 G/DL (ref 13.5–17.5)
LYMPHOCYTES # BLD: 1.7 K/UL (ref 1–5.1)
LYMPHOCYTES NFR BLD: 18.9 %
MCH RBC QN AUTO: 22.1 PG (ref 26–34)
MCHC RBC AUTO-ENTMCNC: 31.1 G/DL (ref 31–36)
MCV RBC AUTO: 71.1 FL (ref 80–100)
METHADONE UR QL SCN>300 NG/ML: ABNORMAL
MONOCYTES # BLD: 0.7 K/UL (ref 0–1.3)
MONOCYTES NFR BLD: 8.3 %
NEUTROPHILS # BLD: 6.2 K/UL (ref 1.7–7.7)
NEUTROPHILS NFR BLD: 69.9 %
OPIATES UR QL SCN>300 NG/ML: ABNORMAL
OXYCODONE UR QL SCN: ABNORMAL
PCP UR QL SCN>25 NG/ML: ABNORMAL
PERFORMED ON: ABNORMAL
PH UR STRIP: 6.5 [PH]
PLATELET # BLD AUTO: 285 K/UL (ref 135–450)
PMV BLD AUTO: 8.8 FL (ref 5–10.5)
POTASSIUM SERPL-SCNC: 4.3 MMOL/L (ref 3.5–5.1)
PROT SERPL-MCNC: 8 G/DL (ref 6.4–8.2)
RBC # BLD AUTO: 4.02 M/UL (ref 4.2–5.9)
SODIUM SERPL-SCNC: 135 MMOL/L (ref 136–145)
TROPONIN, HIGH SENSITIVITY: <6 NG/L (ref 0–22)
WBC # BLD AUTO: 8.8 K/UL (ref 4–11)

## 2024-08-16 PROCEDURE — 96361 HYDRATE IV INFUSION ADD-ON: CPT

## 2024-08-16 PROCEDURE — 70450 CT HEAD/BRAIN W/O DYE: CPT

## 2024-08-16 PROCEDURE — 93005 ELECTROCARDIOGRAM TRACING: CPT | Performed by: EMERGENCY MEDICINE

## 2024-08-16 PROCEDURE — 2580000003 HC RX 258

## 2024-08-16 PROCEDURE — 2580000003 HC RX 258: Performed by: EMERGENCY MEDICINE

## 2024-08-16 PROCEDURE — 96374 THER/PROPH/DIAG INJ IV PUSH: CPT

## 2024-08-16 PROCEDURE — 6370000000 HC RX 637 (ALT 250 FOR IP)

## 2024-08-16 PROCEDURE — 93010 ELECTROCARDIOGRAM REPORT: CPT | Performed by: INTERNAL MEDICINE

## 2024-08-16 PROCEDURE — 84484 ASSAY OF TROPONIN QUANT: CPT

## 2024-08-16 PROCEDURE — 82270 OCCULT BLOOD FECES: CPT

## 2024-08-16 PROCEDURE — 71045 X-RAY EXAM CHEST 1 VIEW: CPT

## 2024-08-16 PROCEDURE — 96375 TX/PRO/DX INJ NEW DRUG ADDON: CPT

## 2024-08-16 PROCEDURE — 99285 EMERGENCY DEPT VISIT HI MDM: CPT

## 2024-08-16 PROCEDURE — 6360000002 HC RX W HCPCS: Performed by: EMERGENCY MEDICINE

## 2024-08-16 PROCEDURE — 1200000000 HC SEMI PRIVATE

## 2024-08-16 PROCEDURE — 82077 ASSAY SPEC XCP UR&BREATH IA: CPT

## 2024-08-16 PROCEDURE — 85025 COMPLETE CBC W/AUTO DIFF WBC: CPT

## 2024-08-16 PROCEDURE — 80053 COMPREHEN METABOLIC PANEL: CPT

## 2024-08-16 PROCEDURE — 80307 DRUG TEST PRSMV CHEM ANLYZR: CPT

## 2024-08-16 RX ORDER — LORAZEPAM 2 MG/ML
2 INJECTION INTRAMUSCULAR
Status: DISCONTINUED | OUTPATIENT
Start: 2024-08-16 | End: 2024-08-17 | Stop reason: HOSPADM

## 2024-08-16 RX ORDER — 0.9 % SODIUM CHLORIDE 0.9 %
1000 INTRAVENOUS SOLUTION INTRAVENOUS ONCE
Status: COMPLETED | OUTPATIENT
Start: 2024-08-16 | End: 2024-08-16

## 2024-08-16 RX ORDER — POLYETHYLENE GLYCOL 3350 17 G/17G
17 POWDER, FOR SOLUTION ORAL DAILY PRN
Status: DISCONTINUED | OUTPATIENT
Start: 2024-08-16 | End: 2024-08-17 | Stop reason: HOSPADM

## 2024-08-16 RX ORDER — ATROPINE SULFATE 0.1 MG/ML
0.5 INJECTION INTRAVENOUS ONCE
Status: COMPLETED | OUTPATIENT
Start: 2024-08-16 | End: 2024-08-16

## 2024-08-16 RX ORDER — MULTIVITAMIN WITH IRON
1 TABLET ORAL DAILY
Status: DISCONTINUED | OUTPATIENT
Start: 2024-08-16 | End: 2024-08-17 | Stop reason: HOSPADM

## 2024-08-16 RX ORDER — SODIUM CHLORIDE 9 MG/ML
INJECTION, SOLUTION INTRAVENOUS CONTINUOUS
Status: DISCONTINUED | OUTPATIENT
Start: 2024-08-16 | End: 2024-08-17

## 2024-08-16 RX ORDER — SODIUM CHLORIDE 0.9 % (FLUSH) 0.9 %
5-40 SYRINGE (ML) INJECTION EVERY 12 HOURS SCHEDULED
Status: DISCONTINUED | OUTPATIENT
Start: 2024-08-16 | End: 2024-08-17 | Stop reason: HOSPADM

## 2024-08-16 RX ORDER — SODIUM CHLORIDE 0.9 % (FLUSH) 0.9 %
5-40 SYRINGE (ML) INJECTION PRN
Status: DISCONTINUED | OUTPATIENT
Start: 2024-08-16 | End: 2024-08-17 | Stop reason: HOSPADM

## 2024-08-16 RX ORDER — ONDANSETRON 2 MG/ML
4 INJECTION INTRAMUSCULAR; INTRAVENOUS ONCE
Status: COMPLETED | OUTPATIENT
Start: 2024-08-16 | End: 2024-08-16

## 2024-08-16 RX ORDER — NALOXONE HYDROCHLORIDE 1 MG/ML
1 INJECTION INTRAMUSCULAR; INTRAVENOUS; SUBCUTANEOUS ONCE
Status: COMPLETED | OUTPATIENT
Start: 2024-08-16 | End: 2024-08-16

## 2024-08-16 RX ORDER — ONDANSETRON 4 MG/1
4 TABLET, ORALLY DISINTEGRATING ORAL EVERY 8 HOURS PRN
Status: DISCONTINUED | OUTPATIENT
Start: 2024-08-16 | End: 2024-08-17 | Stop reason: HOSPADM

## 2024-08-16 RX ORDER — LORAZEPAM 1 MG/1
1 TABLET ORAL
Status: DISCONTINUED | OUTPATIENT
Start: 2024-08-16 | End: 2024-08-17 | Stop reason: HOSPADM

## 2024-08-16 RX ORDER — LORAZEPAM 1 MG/1
3 TABLET ORAL
Status: DISCONTINUED | OUTPATIENT
Start: 2024-08-16 | End: 2024-08-17 | Stop reason: HOSPADM

## 2024-08-16 RX ORDER — ACETAMINOPHEN 325 MG/1
650 TABLET ORAL EVERY 6 HOURS PRN
Status: DISCONTINUED | OUTPATIENT
Start: 2024-08-16 | End: 2024-08-17 | Stop reason: HOSPADM

## 2024-08-16 RX ORDER — ONDANSETRON 2 MG/ML
4 INJECTION INTRAMUSCULAR; INTRAVENOUS EVERY 6 HOURS PRN
Status: DISCONTINUED | OUTPATIENT
Start: 2024-08-16 | End: 2024-08-17 | Stop reason: HOSPADM

## 2024-08-16 RX ORDER — LORAZEPAM 2 MG/ML
1 INJECTION INTRAMUSCULAR ONCE
Status: COMPLETED | OUTPATIENT
Start: 2024-08-16 | End: 2024-08-16

## 2024-08-16 RX ORDER — LORAZEPAM 2 MG/ML
3 INJECTION INTRAMUSCULAR
Status: DISCONTINUED | OUTPATIENT
Start: 2024-08-16 | End: 2024-08-17 | Stop reason: HOSPADM

## 2024-08-16 RX ORDER — ENOXAPARIN SODIUM 100 MG/ML
40 INJECTION SUBCUTANEOUS DAILY
Status: DISCONTINUED | OUTPATIENT
Start: 2024-08-17 | End: 2024-08-17 | Stop reason: HOSPADM

## 2024-08-16 RX ORDER — LORAZEPAM 2 MG/ML
4 INJECTION INTRAMUSCULAR
Status: DISCONTINUED | OUTPATIENT
Start: 2024-08-16 | End: 2024-08-17 | Stop reason: HOSPADM

## 2024-08-16 RX ORDER — POTASSIUM CHLORIDE 7.45 MG/ML
10 INJECTION INTRAVENOUS PRN
Status: DISCONTINUED | OUTPATIENT
Start: 2024-08-16 | End: 2024-08-17 | Stop reason: HOSPADM

## 2024-08-16 RX ORDER — LORAZEPAM 2 MG/ML
1 INJECTION INTRAMUSCULAR
Status: DISCONTINUED | OUTPATIENT
Start: 2024-08-16 | End: 2024-08-17 | Stop reason: HOSPADM

## 2024-08-16 RX ORDER — POTASSIUM CHLORIDE 20 MEQ/1
40 TABLET, EXTENDED RELEASE ORAL PRN
Status: DISCONTINUED | OUTPATIENT
Start: 2024-08-16 | End: 2024-08-17 | Stop reason: HOSPADM

## 2024-08-16 RX ORDER — ACETAMINOPHEN 650 MG/1
650 SUPPOSITORY RECTAL EVERY 6 HOURS PRN
Status: DISCONTINUED | OUTPATIENT
Start: 2024-08-16 | End: 2024-08-17 | Stop reason: HOSPADM

## 2024-08-16 RX ORDER — SODIUM CHLORIDE 9 MG/ML
INJECTION, SOLUTION INTRAVENOUS PRN
Status: DISCONTINUED | OUTPATIENT
Start: 2024-08-16 | End: 2024-08-16 | Stop reason: SDUPTHER

## 2024-08-16 RX ORDER — SODIUM CHLORIDE 9 MG/ML
1000 INJECTION, SOLUTION INTRAVENOUS CONTINUOUS
Status: DISCONTINUED | OUTPATIENT
Start: 2024-08-16 | End: 2024-08-16

## 2024-08-16 RX ORDER — LORAZEPAM 1 MG/1
4 TABLET ORAL
Status: DISCONTINUED | OUTPATIENT
Start: 2024-08-16 | End: 2024-08-17 | Stop reason: HOSPADM

## 2024-08-16 RX ORDER — LORAZEPAM 1 MG/1
2 TABLET ORAL
Status: DISCONTINUED | OUTPATIENT
Start: 2024-08-16 | End: 2024-08-17 | Stop reason: HOSPADM

## 2024-08-16 RX ORDER — SODIUM CHLORIDE 9 MG/ML
INJECTION, SOLUTION INTRAVENOUS PRN
Status: DISCONTINUED | OUTPATIENT
Start: 2024-08-16 | End: 2024-08-17 | Stop reason: HOSPADM

## 2024-08-16 RX ORDER — GAUZE BANDAGE 2" X 2"
100 BANDAGE TOPICAL DAILY
Status: DISCONTINUED | OUTPATIENT
Start: 2024-08-16 | End: 2024-08-17 | Stop reason: HOSPADM

## 2024-08-16 RX ORDER — FOLIC ACID 1 MG/1
1 TABLET ORAL DAILY
Status: DISCONTINUED | OUTPATIENT
Start: 2024-08-16 | End: 2024-08-17 | Stop reason: HOSPADM

## 2024-08-16 RX ORDER — MAGNESIUM SULFATE IN WATER 40 MG/ML
2000 INJECTION, SOLUTION INTRAVENOUS PRN
Status: DISCONTINUED | OUTPATIENT
Start: 2024-08-16 | End: 2024-08-17 | Stop reason: HOSPADM

## 2024-08-16 RX ADMIN — SODIUM CHLORIDE 1000 ML: 9 INJECTION, SOLUTION INTRAVENOUS at 14:34

## 2024-08-16 RX ADMIN — ATROPINE SULFATE INJECTION 0.5 MG: 0.1 INJECTION, SOLUTION INTRAVENOUS at 12:14

## 2024-08-16 RX ADMIN — ONDANSETRON 4 MG: 2 INJECTION INTRAMUSCULAR; INTRAVENOUS at 12:14

## 2024-08-16 RX ADMIN — FOLIC ACID 1 MG: 1 TABLET ORAL at 22:34

## 2024-08-16 RX ADMIN — Medication 100 MG: at 22:34

## 2024-08-16 RX ADMIN — THERA TABS 1 TABLET: TAB at 22:34

## 2024-08-16 RX ADMIN — NALOXONE HYDROCHLORIDE 2 MG: 1 INJECTION PARENTERAL at 12:04

## 2024-08-16 RX ADMIN — SODIUM CHLORIDE: 9 INJECTION, SOLUTION INTRAVENOUS at 22:38

## 2024-08-16 RX ADMIN — LORAZEPAM 1 MG: 2 INJECTION INTRAMUSCULAR; INTRAVENOUS at 12:24

## 2024-08-16 RX ADMIN — SODIUM CHLORIDE 1000 ML: 9 INJECTION, SOLUTION INTRAVENOUS at 13:48

## 2024-08-16 ASSESSMENT — PAIN SCALES - GENERAL
PAINLEVEL_OUTOF10: 0

## 2024-08-16 ASSESSMENT — PAIN - FUNCTIONAL ASSESSMENT
PAIN_FUNCTIONAL_ASSESSMENT: NONE - DENIES PAIN

## 2024-08-16 NOTE — ED PROVIDER NOTES
Emergency Department Provider Note  Location: [facility] EMERGENCY DEPARTMENT  8/16/2024     Patient Identification  Bonifacio Wolfe is a 48 y.o. male    Chief Complaint  Altered Mental Status (Paramedic reports pt found unresponsive behind [employer]. Bystanders started compressions. Narcan 4mg nasally given. Pt awoke and pulled iv out. Pt upon arrival pale, pt responds to pain. Bradycardic on monitor)          HPI  (History provided by EMS)  Patient is 48-year-old male arrives for altered mental status found unresponsive behind a gas station by bystanders.  Intermittently responsive for EMS.  Reportedly bystanders had done compressions at some point however not being performed on EMS arrival and had a palpable pulse.  Intermittently apneic was given Narcan 4 mg intranasally with questionable response.  Smells of alcohol reported that he did drink alcohol.    Patient arrives into a resuscitation bay he is altered he is localizing to noxious stimuli GCS 10.  Fingerstick glucose 200.  Patient responds to some questions but is overall difficult historian.  Admits to drinking alcohol admits to snorting cocaine.      Nursing Notes were all reviewed and agreed with, or any disagreements were addressed in the HPI:  Allergies:   Allergies   Allergen Reactions    Diclofenac Sodium-Menthol Other (See Comments)     PATIENT STATES THAT HE WAS SLEEP WALKING AFTER TAKING THIS MEDICATION AND ALMOST WALKED OFF THE SECOND FLOOR.       Past medical history:  has a past medical history of Affective disorder (HCC), Diabetes mellitus (HCC), Hepatitis C, Homeless, Knee pain, Osteoarthritis, Overdose, Pneumonia, and Sciatica.    Past surgical history:  has a past surgical history that includes fracture surgery; knee surgery; and Tibia fracture surgery.    Home medications:   Prior to Admission medications    Medication Sig Start Date End Date Taking? Authorizing Provider   ibuprofen (ADVIL;MOTRIN) 600 MG tablet Take 1 tablet by mouth 3 times  given a dose of atropine which did transiently improve his heart rate up to the 70s.  - Patient was placed on telemetry during his/her ED stay and no malignant dysrhythmia observed otherwise.  - Diagnostic studies reviewed.  Ethanol 105.  No other significant metabolic derangements to explain his symptoms.  Also noticed a microcytic anemia.  No melena on rectal exam.  -Will plan to admit for encephalopathy and further monitoring of bradycardia.    - Patient was given    ED Medication Orders (From admission, onward)      Start Ordered     Status Ordering Provider    08/16/24 1345 08/16/24 1340  sodium chloride 0.9 % bolus 1,000 mL  ONCE         Last MAR action: New Bag - by SAM HERNANDEZ on 08/16/24 at 1348 HARVEY ARRIAGA    08/16/24 1345 08/16/24 1340  0.9 % sodium chloride infusion  CONTINUOUS         Acknowledged HARVEY ARRIAGA    08/16/24 1230 08/16/24 1222  LORazepam (ATIVAN) injection 1 mg  ONCE         Last MAR action: Given - by SAM HERNANDEZ on 08/16/24 at 1224 HARVEY ARRIAGA    08/16/24 1215 08/16/24 1211  ondansetron (ZOFRAN) injection 4 mg  ONCE         Last MAR action: Given - by SAM HERNANDEZ on 08/16/24 at 1214 HARVEY ARRIAGA    08/16/24 1215 08/16/24 1211  atropine injection 0.5 mg  ONCE         Last MAR action: Given - by SAM HERNANDEZ on 08/16/24 at 1214 HARVEY ARRIAGA    08/16/24 1200 08/16/24 1158  naloxone (NARCAN) injection 1 mg  ONCE         Last MAR action: Given - by SAM HERNANDEZ on 08/16/24 at 1204 HARVEY ARRIAGA              - I discussed the results with patient/family including incidental findings.  - At this point I do not see indication for further work-up in the ER, as it is unlikely  and poses more risk than benefit.  - Follow up plan and return precautions also discussed.  Patient/family verbalized understanding of plan and agreeable to plan.        Clinical Impression:  1. Encephalopathy    2. Substance use    3. Bradycardia    4. Microcytic anemia

## 2024-08-16 NOTE — ED NOTES
Pt assigned to 6S at [facility]. Perfect serve sent to Dr. Carroll to confirm pt did not need a bed on PCU/ ICU. Msg read, no change in bed assignment as of this time. Transport will be arranged in [employer] for [facility], room 6321.

## 2024-08-16 NOTE — ED NOTES
Pt aroused when name called. Drowsy. Oriented person and place. Knows year. Sts month is September. Follows commands. Pt sts someone gave him a pill but doesn't know what it was this am. Dozes off to sleep. Wrist restraints removed

## 2024-08-17 VITALS
SYSTOLIC BLOOD PRESSURE: 115 MMHG | HEIGHT: 72 IN | DIASTOLIC BLOOD PRESSURE: 74 MMHG | RESPIRATION RATE: 16 BRPM | WEIGHT: 156 LBS | OXYGEN SATURATION: 97 % | HEART RATE: 54 BPM | TEMPERATURE: 98 F | BODY MASS INDEX: 21.13 KG/M2

## 2024-08-17 PROCEDURE — 6370000000 HC RX 637 (ALT 250 FOR IP)

## 2024-08-17 RX ORDER — NICOTINE 21 MG/24HR
1 PATCH, TRANSDERMAL 24 HOURS TRANSDERMAL DAILY
Status: DISCONTINUED | OUTPATIENT
Start: 2024-08-17 | End: 2024-08-17 | Stop reason: HOSPADM

## 2024-08-17 RX ADMIN — FOLIC ACID 1 MG: 1 TABLET ORAL at 08:54

## 2024-08-17 RX ADMIN — LORAZEPAM 2 MG: 1 TABLET ORAL at 02:19

## 2024-08-17 RX ADMIN — Medication 100 MG: at 08:54

## 2024-08-17 RX ADMIN — THERA TABS 1 TABLET: TAB at 08:54

## 2024-08-17 ASSESSMENT — ENCOUNTER SYMPTOMS
COUGH: 0
EYES NEGATIVE: 1
DIARRHEA: 0
BACK PAIN: 1
RESPIRATORY NEGATIVE: 1
VOMITING: 0
SHORTNESS OF BREATH: 0
CHOKING: 0
ABDOMINAL DISTENTION: 0
NAUSEA: 0

## 2024-08-17 NOTE — PROGRESS NOTES
4 Eyes Skin Assessment     NAME:  Bonifacio Wolfe  YOB: 1975  MEDICAL RECORD NUMBER:  [id number]    The patient is being assessed for  Admission    I agree that at least one RN has performed a thorough Head to Toe Skin Assessment on the patient. ALL assessment sites listed below have been assessed.      Areas assessed by both nurses:    Head, Face, Ears, Shoulders, Back, Chest, Arms, Elbows, Hands, Sacrum. Buttock, Coccyx, Ischium, Legs. Feet and Heels, and Under Medical Devices         Does the Patient have a Wound? No noted wound(s)       Rubens Prevention initiated by RN: No  Wound Care Orders initiated by RN: No    Pressure Injury (Stage 3,4, Unstageable, DTI, NWPT, and Complex wounds) if present, place Wound referral order by RN under : No    New Ostomies, if present place, Ostomy referral order under : No     Nurse 1 eSignature: Electronically signed by Delmy Martínez RN on 8/17/24 at 6:44 AM EDT    **SHARE this note so that the co-signing nurse can place an eSignature**    Nurse 2 eSignature: Electronically signed by Darcy Yancey RN on 8/17/24 at 6:51 AM EDT

## 2024-08-17 NOTE — PLAN OF CARE
Problem: Discharge Planning  Goal: Discharge to home or other facility with appropriate resources  Outcome: Progressing   Continue monitor for withdrawal.       Problem: Safety - Adult  Goal: Free from fall injury  Outcome: Progressing   Patient has remained free of falls. 2/4 bed rails up, bed locked and in lowest position, call light within reach. Patient instructed on use of call light and uses appropriately. Bed alarm on. Non-skid footwear and fall band on.

## 2024-08-17 NOTE — DISCHARGE SUMMARY
INTERNAL MEDICINE DEPARTMENT AT THE [facility]  DISCHARGE SUMMARY    Patient ID: Bonifacio Wolfe                                             Discharge Date: 8/17/2024   Patient's PCP: No primary care provider on file.                                          Discharge Physician: Jose Montaño DO   Admit Date: 8/16/2024   Admitting Physician: Mitch Rodriguez MD    PROBLEMS DURING HOSPITALIZATION:  Present on Admission:   Acute encephalopathy   AMS (altered mental status)      DISCHARGE DIAGNOSES: Drug overdose     HPI:Darren Wolfe, a 48 years old male with a PMH of bladder cancer, presented to [facility] ED for AMS.      Patient has a known history of multiple drug overdose and alcohol consumption.  He also admitted drinking alcohol and snorting cocaine this time. Patient states he has a history of a back fracture  after he fell off a roof. He has had chronic pain since. Today he was given pain med to him by someone on the street.      By EMS, patient was found unresponsive behind a gas station bystanders had done compressions at some point but not on EMS arrival.  Found to be apneic with a smell of alcohol. Narcan 4 mg intranasally was given      At ED patient was altered.  Vital signs showed /79 heart rate 53 RR 26 SpO2 99.  Pinpoint pupils on physical examination   CT head without contrast showed no evidence of acute intracranial hemorrhage or mass effect. Chest x-ray showed no acute disease.  Ativan was given for agitation. Opiate toxicity was suspected due to pinpoint pupils and bradycardia so Narcan was given but not big response.  But but bradycardia only responded to atropine.  A decision to be transferred to [facility] for admission for management for encephalopathy and monitoring bradycardia was made.     The following issues were addressed during hospitalization:    Drug overdose  Patient was found unresponsive on a gas station after taking an unknown substance from her friend.  Per EMS

## 2024-08-17 NOTE — PROGRESS NOTES
Patient admitted to unit. Alert and oriented x4, sleepy. VSS with exception to being sinus susan on tele monitor (hospitalist notified). Patient states he drinks 5-6 beers a day. States he uses non-prescribed narcotics every few weeks. Tolerates ambulation well. Patients hands and feet are dirty, offered to get cleaned up but he denied. Not scoring high enough on CIWA at this time to receive ativan. Seizure precautions in place. Fall precautions in place.

## 2024-08-17 NOTE — H&P
Internal Medicine H&P    Date: 2024   Patient: Bonifacio Wolfe   Patient : 1975     CC: Altered Mental Status (Paramedic reports pt found unresponsive behind [employer]. Bystanders started compressions. Narcan 4mg nasally given. Pt awoke and pulled iv out. Pt upon arrival pale, pt responds to pain. Bradycardic on monitor)       Subjective     HPI:   Darren Wolfe, a 48 years old male with a PMH of bladder cancer, presented to [facility] ED for AMS.     Patient has a known history of multiple drug overdose and alcohol consumption.  He also admitted drinking alcohol and snorting cocaine this time. Patient states he has a history of a back fracture  after he fell off a roof. He has had chronic pain since. Today he was given pain med to him by someone on the street.     By EMS, patient was found unresponsive behind a gas station bystanders had done compressions at some point but not on EMS arrival.  Found to be apneic with a smell of alcohol. Narcan 4 mg intranasally was given     At ED patient was altered.  Vital signs showed /79 heart rate 53 RR 26 SpO2 99.  Pinpoint pupils on physical examination   CT head without contrast showed no evidence of acute intracranial hemorrhage or mass effect. Chest x-ray showed no acute disease.  Ativan was given for agitation. Opiate toxicity was suspected due to pinpoint pupils and bradycardia so Narcan was given but not big response.  But but bradycardia only responded to atropine.  A decision to be transferred to [facility] for admission for management for encephalopathy and monitoring bradycardia was made.     PMHx:      Diagnosis Date    Affective disorder (HCC)     Diabetes mellitus (HCC)     Hepatitis C     Homeless     Knee pain     Osteoarthritis     Overdose     Pneumonia     Sciatica        PSurgHx:      Procedure Laterality Date    FRACTURE SURGERY      KNEE SURGERY      TIBIA FRACTURE SURGERY          Medication:  Medications Prior to Admission: ibuprofen  inpatient  - Upon discharge: Home    Will discuss with attending physician Mitch Gauthier MD Hasan Sedeeq, MD  Internal Medicine, PGY-1    I saw and evaluated the patient, performing the key elements of the service.  I discussed the findings, assessment and plan with the resident and agree with the resident's findings and plan as documented in the resident's note.

## 2024-08-17 NOTE — PROGRESS NOTES
Patient was found on camera using a salt packet to snort an unknown white substance. Patient states it was the ativan this RN gave him earlier but this RN watched the patient swallow the ativan and had him open his mouth to check that the pills were taken. Security called and patients belongings searched. Nothing was found. MD notified. Vitals stable.

## 2024-09-25 ENCOUNTER — HOSPITAL ENCOUNTER (EMERGENCY)
Age: 49
Discharge: ANOTHER HOSPITAL | End: 2024-09-25
Attending: EMERGENCY MEDICINE
Payer: MEDICAID

## 2024-09-25 ENCOUNTER — APPOINTMENT (OUTPATIENT)
Dept: GENERAL RADIOLOGY | Age: 49
End: 2024-09-25
Payer: MEDICAID

## 2024-09-25 ENCOUNTER — APPOINTMENT (OUTPATIENT)
Dept: CT IMAGING | Age: 49
End: 2024-09-25
Payer: MEDICAID

## 2024-09-25 VITALS
HEART RATE: 56 BPM | RESPIRATION RATE: 14 BRPM | DIASTOLIC BLOOD PRESSURE: 76 MMHG | TEMPERATURE: 98.2 F | SYSTOLIC BLOOD PRESSURE: 113 MMHG | OXYGEN SATURATION: 98 % | BODY MASS INDEX: 21.7 KG/M2 | WEIGHT: 155 LBS | HEIGHT: 71 IN

## 2024-09-25 DIAGNOSIS — S82.142A CLOSED FRACTURE OF LEFT TIBIAL PLATEAU, INITIAL ENCOUNTER: Primary | ICD-10-CM

## 2024-09-25 DIAGNOSIS — D64.9 ANEMIA, UNSPECIFIED TYPE: ICD-10-CM

## 2024-09-25 DIAGNOSIS — R31.9 HEMATURIA, UNSPECIFIED TYPE: ICD-10-CM

## 2024-09-25 LAB
ACANTHOCYTES BLD QL SMEAR: ABNORMAL
ALBUMIN SERPL-MCNC: 4 G/DL (ref 3.4–5)
ALBUMIN/GLOB SERPL: 1.4 {RATIO} (ref 1.1–2.2)
ALP SERPL-CCNC: 90 U/L (ref 40–129)
ALT SERPL-CCNC: 10 U/L (ref 10–40)
ANION GAP SERPL CALCULATED.3IONS-SCNC: 11 MMOL/L (ref 3–16)
ANISOCYTOSIS BLD QL SMEAR: ABNORMAL
APTT BLD: 25.8 SEC (ref 22.1–36.4)
AST SERPL-CCNC: 14 U/L (ref 15–37)
BACTERIA URNS QL MICRO: ABNORMAL /HPF
BASOPHILS # BLD: 0.1 K/UL (ref 0–0.2)
BASOPHILS NFR BLD: 1 %
BILIRUB SERPL-MCNC: <0.2 MG/DL (ref 0–1)
BILIRUB UR QL STRIP.AUTO: NEGATIVE
BUN SERPL-MCNC: 11 MG/DL (ref 7–20)
CALCIUM SERPL-MCNC: 9.4 MG/DL (ref 8.3–10.6)
CHLORIDE SERPL-SCNC: 100 MMOL/L (ref 99–110)
CLARITY UR: CLEAR
CO2 SERPL-SCNC: 23 MMOL/L (ref 21–32)
COLOR UR: YELLOW
CREAT SERPL-MCNC: 0.8 MG/DL (ref 0.9–1.3)
DEPRECATED RDW RBC AUTO: 17.9 % (ref 12.4–15.4)
EOSINOPHIL # BLD: 0.3 K/UL (ref 0–0.6)
EOSINOPHIL NFR BLD: 4 %
EPI CELLS #/AREA URNS HPF: ABNORMAL /HPF (ref 0–5)
GFR SERPLBLD CREATININE-BSD FMLA CKD-EPI: >90 ML/MIN/{1.73_M2}
GLUCOSE SERPL-MCNC: 93 MG/DL (ref 70–99)
GLUCOSE UR STRIP.AUTO-MCNC: NEGATIVE MG/DL
HCT VFR BLD AUTO: 20 % (ref 40.5–52.5)
HGB BLD-MCNC: 6 G/DL (ref 13.5–17.5)
HGB UR QL STRIP.AUTO: ABNORMAL
HYPOCHROMIA BLD QL SMEAR: ABNORMAL
INR PPP: 1.04 (ref 0.85–1.15)
KETONES UR STRIP.AUTO-MCNC: NEGATIVE MG/DL
LEUKOCYTE ESTERASE UR QL STRIP.AUTO: NEGATIVE
LYMPHOCYTES # BLD: 1.2 K/UL (ref 1–5.1)
LYMPHOCYTES NFR BLD: 18 %
MACROCYTES BLD QL SMEAR: ABNORMAL
MCH RBC QN AUTO: 19.5 PG (ref 26–34)
MCHC RBC AUTO-ENTMCNC: 29.8 G/DL (ref 31–36)
MCV RBC AUTO: 65.4 FL (ref 80–100)
MICROCYTES BLD QL SMEAR: ABNORMAL
MONOCYTES # BLD: 0.1 K/UL (ref 0–1.3)
MONOCYTES NFR BLD: 2 %
NEUTROPHILS # BLD: 4.9 K/UL (ref 1.7–7.7)
NEUTROPHILS NFR BLD: 73 %
NEUTS BAND NFR BLD MANUAL: 2 % (ref 0–7)
NITRITE UR QL STRIP.AUTO: NEGATIVE
OVALOCYTES BLD QL SMEAR: ABNORMAL
PATH INTERP BLD-IMP: YES
PH UR STRIP.AUTO: 7 [PH] (ref 5–8)
PLATELET # BLD AUTO: 372 K/UL (ref 135–450)
PLATELET BLD QL SMEAR: ADEQUATE
PMV BLD AUTO: 8.2 FL (ref 5–10.5)
POTASSIUM SERPL-SCNC: 4.2 MMOL/L (ref 3.5–5.1)
PROT SERPL-MCNC: 6.9 G/DL (ref 6.4–8.2)
PROT UR STRIP.AUTO-MCNC: ABNORMAL MG/DL
PROTHROMBIN TIME: 13.9 SEC (ref 11.9–14.9)
RBC # BLD AUTO: 3.05 M/UL (ref 4.2–5.9)
RBC #/AREA URNS HPF: ABNORMAL /HPF (ref 0–4)
SCHISTOCYTES BLD QL SMEAR: ABNORMAL
SODIUM SERPL-SCNC: 134 MMOL/L (ref 136–145)
SP GR UR STRIP.AUTO: 1.01 (ref 1–1.03)
STOMATOCYTES BLD QL SMEAR: ABNORMAL
UA COMPLETE W REFLEX CULTURE PNL UR: YES
UA DIPSTICK W REFLEX MICRO PNL UR: YES
URN SPEC COLLECT METH UR: ABNORMAL
UROBILINOGEN UR STRIP-ACNC: 0.2 E.U./DL
WBC # BLD AUTO: 6.5 K/UL (ref 4–11)
WBC #/AREA URNS HPF: ABNORMAL /HPF (ref 0–5)

## 2024-09-25 PROCEDURE — 96360 HYDRATION IV INFUSION INIT: CPT

## 2024-09-25 PROCEDURE — 73590 X-RAY EXAM OF LOWER LEG: CPT

## 2024-09-25 PROCEDURE — 99285 EMERGENCY DEPT VISIT HI MDM: CPT

## 2024-09-25 PROCEDURE — 87086 URINE CULTURE/COLONY COUNT: CPT

## 2024-09-25 PROCEDURE — 80053 COMPREHEN METABOLIC PANEL: CPT

## 2024-09-25 PROCEDURE — 93005 ELECTROCARDIOGRAM TRACING: CPT | Performed by: EMERGENCY MEDICINE

## 2024-09-25 PROCEDURE — 6370000000 HC RX 637 (ALT 250 FOR IP): Performed by: EMERGENCY MEDICINE

## 2024-09-25 PROCEDURE — 2580000003 HC RX 258: Performed by: EMERGENCY MEDICINE

## 2024-09-25 PROCEDURE — 73700 CT LOWER EXTREMITY W/O DYE: CPT

## 2024-09-25 PROCEDURE — 85730 THROMBOPLASTIN TIME PARTIAL: CPT

## 2024-09-25 PROCEDURE — 85025 COMPLETE CBC W/AUTO DIFF WBC: CPT

## 2024-09-25 PROCEDURE — 85610 PROTHROMBIN TIME: CPT

## 2024-09-25 PROCEDURE — 81001 URINALYSIS AUTO W/SCOPE: CPT

## 2024-09-25 RX ORDER — 0.9 % SODIUM CHLORIDE 0.9 %
1000 INTRAVENOUS SOLUTION INTRAVENOUS ONCE
Status: COMPLETED | OUTPATIENT
Start: 2024-09-25 | End: 2024-09-25

## 2024-09-25 RX ORDER — OXYCODONE AND ACETAMINOPHEN 5; 325 MG/1; MG/1
1 TABLET ORAL ONCE
Status: COMPLETED | OUTPATIENT
Start: 2024-09-25 | End: 2024-09-25

## 2024-09-25 RX ADMIN — SODIUM CHLORIDE 1000 ML: 9 INJECTION, SOLUTION INTRAVENOUS at 19:38

## 2024-09-25 RX ADMIN — OXYCODONE HYDROCHLORIDE AND ACETAMINOPHEN 1 TABLET: 5; 325 TABLET ORAL at 18:52

## 2024-09-25 ASSESSMENT — PAIN DESCRIPTION - FREQUENCY: FREQUENCY: CONTINUOUS

## 2024-09-25 ASSESSMENT — PAIN DESCRIPTION - DESCRIPTORS: DESCRIPTORS: ACHING

## 2024-09-25 ASSESSMENT — PAIN DESCRIPTION - LOCATION: LOCATION: LEG

## 2024-09-25 ASSESSMENT — PAIN DESCRIPTION - PAIN TYPE: TYPE: ACUTE PAIN

## 2024-09-25 ASSESSMENT — PAIN - FUNCTIONAL ASSESSMENT: PAIN_FUNCTIONAL_ASSESSMENT: 0-10

## 2024-09-25 ASSESSMENT — PAIN DESCRIPTION - ORIENTATION: ORIENTATION: LEFT

## 2024-09-26 LAB — PATH INTERP BLD-IMP: NORMAL

## 2024-09-27 LAB
BACTERIA UR CULT: NORMAL
EKG ATRIAL RATE: 60 BPM
EKG DIAGNOSIS: NORMAL
EKG P AXIS: 68 DEGREES
EKG P-R INTERVAL: 170 MS
EKG Q-T INTERVAL: 420 MS
EKG QRS DURATION: 86 MS
EKG QTC CALCULATION (BAZETT): 420 MS
EKG R AXIS: 67 DEGREES
EKG T AXIS: 67 DEGREES
EKG VENTRICULAR RATE: 60 BPM

## 2024-09-27 PROCEDURE — 93010 ELECTROCARDIOGRAM REPORT: CPT | Performed by: INTERNAL MEDICINE

## 2025-05-30 ENCOUNTER — APPOINTMENT (OUTPATIENT)
Dept: GENERAL RADIOLOGY | Age: 50
End: 2025-05-30
Payer: MEDICAID

## 2025-05-30 ENCOUNTER — HOSPITAL ENCOUNTER (INPATIENT)
Age: 50
LOS: 7 days | Discharge: HOME | End: 2025-06-06
Attending: EMERGENCY MEDICINE
Payer: MEDICAID

## 2025-05-30 ENCOUNTER — APPOINTMENT (OUTPATIENT)
Dept: CT IMAGING | Age: 50
End: 2025-05-30
Payer: MEDICAID

## 2025-05-30 DIAGNOSIS — T50.901A ACCIDENTAL DRUG OVERDOSE, INITIAL ENCOUNTER: Primary | ICD-10-CM

## 2025-05-30 DIAGNOSIS — R60.0 LEG EDEMA, LEFT: ICD-10-CM

## 2025-05-30 DIAGNOSIS — R60.0 LEG EDEMA, RIGHT: ICD-10-CM

## 2025-05-30 DIAGNOSIS — D64.9 ANEMIA, UNSPECIFIED TYPE: ICD-10-CM

## 2025-05-30 DIAGNOSIS — N32.89 BLADDER MASS: ICD-10-CM

## 2025-05-30 PROBLEM — S82.142D CLOSED FRACTURE OF LEFT TIBIAL PLATEAU WITH ROUTINE HEALING: Status: ACTIVE | Noted: 2024-10-18

## 2025-05-30 PROBLEM — M00.9 SEPTIC ARTHRITIS OF KNEE, LEFT (HCC): Status: ACTIVE | Noted: 2021-10-07

## 2025-05-30 PROBLEM — B19.20 VIRAL HEPATITIS C: Status: ACTIVE | Noted: 2021-01-22

## 2025-05-30 PROBLEM — M17.32 POST-TRAUMATIC OSTEOARTHRITIS OF LEFT KNEE: Status: ACTIVE | Noted: 2017-05-02

## 2025-05-30 PROBLEM — K76.0 HEPATIC STEATOSIS: Status: ACTIVE | Noted: 2025-05-30

## 2025-05-30 LAB
ABO/RH: NORMAL
ALBUMIN SERPL-MCNC: 3.3 G/DL (ref 3.4–5)
ALBUMIN/GLOB SERPL: 1.3 {RATIO} (ref 1.1–2.2)
ALP SERPL-CCNC: 76 U/L (ref 40–129)
ALT SERPL-CCNC: 28 U/L (ref 10–40)
AMORPH SED URNS QL MICRO: ABNORMAL /HPF
AMPHETAMINES UR QL SCN>1000 NG/ML: ABNORMAL
ANION GAP SERPL CALCULATED.3IONS-SCNC: 11 MMOL/L (ref 3–16)
ANTIBODY SCREEN: NORMAL
APAP SERPL-MCNC: <5 UG/ML (ref 10–30)
AST SERPL-CCNC: 55 U/L (ref 15–37)
BARBITURATES UR QL SCN>200 NG/ML: ABNORMAL
BASE EXCESS BLDV CALC-SCNC: 0.3 MMOL/L (ref -2–3)
BASOPHILS # BLD: 0.1 K/UL (ref 0–0.2)
BASOPHILS NFR BLD: 1.1 %
BENZODIAZ UR QL SCN>200 NG/ML: POSITIVE
BILIRUB SERPL-MCNC: <0.2 MG/DL (ref 0–1)
BILIRUB UR QL STRIP.AUTO: NEGATIVE
BLOOD BANK DISPENSE STATUS: NORMAL
BLOOD BANK DISPENSE STATUS: NORMAL
BLOOD BANK PRODUCT CODE: NORMAL
BLOOD BANK PRODUCT CODE: NORMAL
BPU ID: NORMAL
BPU ID: NORMAL
BUN SERPL-MCNC: 30 MG/DL (ref 7–20)
CALCIUM SERPL-MCNC: 8.7 MG/DL (ref 8.3–10.6)
CANNABINOIDS UR QL SCN>50 NG/ML: ABNORMAL
CHLORIDE SERPL-SCNC: 103 MMOL/L (ref 99–110)
CLARITY UR: ABNORMAL
CO2 BLDV-SCNC: 28 MMOL/L
CO2 SERPL-SCNC: 22 MMOL/L (ref 21–32)
COCAINE UR QL SCN: POSITIVE
COHGB MFR BLDV: 4 % (ref 0–1.5)
COLOR UR: YELLOW
CREAT SERPL-MCNC: 1.3 MG/DL (ref 0.9–1.3)
DEPRECATED RDW RBC AUTO: 19 % (ref 12.4–15.4)
DESCRIPTION BLOOD BANK: NORMAL
DESCRIPTION BLOOD BANK: NORMAL
DO-HGB MFR BLDV: 35.3 %
DRUG SCREEN COMMENT UR-IMP: ABNORMAL
EKG ATRIAL RATE: 64 BPM
EKG DIAGNOSIS: NORMAL
EKG P AXIS: -17 DEGREES
EKG P-R INTERVAL: 150 MS
EKG Q-T INTERVAL: 412 MS
EKG QRS DURATION: 90 MS
EKG QTC CALCULATION (BAZETT): 425 MS
EKG R AXIS: 78 DEGREES
EKG T AXIS: 62 DEGREES
EKG VENTRICULAR RATE: 64 BPM
EOSINOPHIL # BLD: 0.2 K/UL (ref 0–0.6)
EOSINOPHIL NFR BLD: 2.8 %
EPI CELLS #/AREA URNS HPF: ABNORMAL /HPF (ref 0–5)
ETHANOLAMINE SERPL-MCNC: NORMAL MG/DL (ref 0–0.08)
FENTANYL SCREEN, URINE: POSITIVE
FERRITIN SERPL IA-MCNC: 5.5 NG/ML (ref 30–400)
GFR SERPLBLD CREATININE-BSD FMLA CKD-EPI: 67 ML/MIN/{1.73_M2}
GLUCOSE BLD-MCNC: 122 MG/DL (ref 70–99)
GLUCOSE SERPL-MCNC: 107 MG/DL (ref 70–99)
GLUCOSE UR STRIP.AUTO-MCNC: NEGATIVE MG/DL
HCO3 BLDV-SCNC: 26.1 MMOL/L (ref 24–28)
HCT VFR BLD AUTO: 15.8 % (ref 40.5–52.5)
HCT VFR BLD AUTO: 16.2 % (ref 40.5–52.5)
HCT VFR BLD AUTO: 23.8 % (ref 40.5–52.5)
HGB BLD-MCNC: 4.7 G/DL (ref 13.5–17.5)
HGB BLD-MCNC: 7.4 G/DL (ref 13.5–17.5)
HGB UR QL STRIP.AUTO: ABNORMAL
IMM RETICS NFR: 0.51 % (ref 0.21–0.37)
KETONES UR STRIP.AUTO-MCNC: NEGATIVE MG/DL
LACTATE BLDV-SCNC: 1.7 MMOL/L (ref 0.4–2)
LEUKOCYTE ESTERASE UR QL STRIP.AUTO: ABNORMAL
LIPASE SERPL-CCNC: 17 U/L (ref 13–60)
LYMPHOCYTES # BLD: 0.6 K/UL (ref 1–5.1)
LYMPHOCYTES NFR BLD: 9.1 %
MCH RBC QN AUTO: 16.8 PG (ref 26–34)
MCHC RBC AUTO-ENTMCNC: 29.8 G/DL (ref 31–36)
MCV RBC AUTO: 56.4 FL (ref 80–100)
METHADONE UR QL SCN>300 NG/ML: ABNORMAL
METHGB MFR BLDV: <0 % (ref 0–1.5)
MONOCYTES # BLD: 0.5 K/UL (ref 0–1.3)
MONOCYTES NFR BLD: 7.8 %
NEUTROPHILS # BLD: 5.6 K/UL (ref 1.7–7.7)
NEUTROPHILS NFR BLD: 79.2 %
NITRITE UR QL STRIP.AUTO: NEGATIVE
NT-PROBNP SERPL-MCNC: 359 PG/ML (ref 0–124)
OPIATES UR QL SCN>300 NG/ML: POSITIVE
OXYCODONE UR QL SCN: ABNORMAL
PCO2 BLDV: 49.3 MMHG (ref 41–51)
PCP UR QL SCN>25 NG/ML: ABNORMAL
PERFORMED ON: ABNORMAL
PH BLDV: 7.33 [PH] (ref 7.35–7.45)
PH UR STRIP.AUTO: 6 [PH] (ref 5–8)
PH UR STRIP: 6 [PH]
PLATELET # BLD AUTO: 432 K/UL (ref 135–450)
PMV BLD AUTO: 7.8 FL (ref 5–10.5)
PO2 BLDV: 38.4 MMHG (ref 25–40)
POTASSIUM SERPL-SCNC: 3.8 MMOL/L (ref 3.5–5.1)
PROT SERPL-MCNC: 5.9 G/DL (ref 6.4–8.2)
PROT UR STRIP.AUTO-MCNC: 100 MG/DL
RBC # BLD AUTO: 2.8 M/UL (ref 4.2–5.9)
RBC #/AREA URNS HPF: ABNORMAL /HPF (ref 0–4)
RETICS # AUTO: 0.04 M/UL
RETICS/RBC NFR AUTO: 1.39 % (ref 0.5–2.18)
SALICYLATES SERPL-MCNC: <0.5 MG/DL (ref 15–30)
SAO2 % BLDV: 63 %
SODIUM SERPL-SCNC: 136 MMOL/L (ref 136–145)
SP GR UR STRIP.AUTO: 1.02 (ref 1–1.03)
TROPONIN, HIGH SENSITIVITY: 18 NG/L (ref 0–22)
TROPONIN, HIGH SENSITIVITY: 19 NG/L (ref 0–22)
UA COMPLETE W REFLEX CULTURE PNL UR: YES
UA DIPSTICK W REFLEX MICRO PNL UR: YES
URN SPEC COLLECT METH UR: ABNORMAL
UROBILINOGEN UR STRIP-ACNC: 0.2 E.U./DL
WBC # BLD AUTO: 7.1 K/UL (ref 4–11)
WBC #/AREA URNS HPF: ABNORMAL /HPF (ref 0–5)

## 2025-05-30 PROCEDURE — 84484 ASSAY OF TROPONIN QUANT: CPT

## 2025-05-30 PROCEDURE — 83605 ASSAY OF LACTIC ACID: CPT

## 2025-05-30 PROCEDURE — 85025 COMPLETE CBC W/AUTO DIFF WBC: CPT

## 2025-05-30 PROCEDURE — 85014 HEMATOCRIT: CPT

## 2025-05-30 PROCEDURE — 80179 DRUG ASSAY SALICYLATE: CPT

## 2025-05-30 PROCEDURE — 83540 ASSAY OF IRON: CPT

## 2025-05-30 PROCEDURE — 88307 TISSUE EXAM BY PATHOLOGIST: CPT

## 2025-05-30 PROCEDURE — 80307 DRUG TEST PRSMV CHEM ANLYZR: CPT

## 2025-05-30 PROCEDURE — 86850 RBC ANTIBODY SCREEN: CPT

## 2025-05-30 PROCEDURE — 6370000000 HC RX 637 (ALT 250 FOR IP)

## 2025-05-30 PROCEDURE — 82607 VITAMIN B-12: CPT

## 2025-05-30 PROCEDURE — 2060000000 HC ICU INTERMEDIATE R&B

## 2025-05-30 PROCEDURE — 71045 X-RAY EXAM CHEST 1 VIEW: CPT

## 2025-05-30 PROCEDURE — 36415 COLL VENOUS BLD VENIPUNCTURE: CPT

## 2025-05-30 PROCEDURE — 86900 BLOOD TYPING SEROLOGIC ABO: CPT

## 2025-05-30 PROCEDURE — 99285 EMERGENCY DEPT VISIT HI MDM: CPT

## 2025-05-30 PROCEDURE — 87086 URINE CULTURE/COLONY COUNT: CPT

## 2025-05-30 PROCEDURE — P9016 RBC LEUKOCYTES REDUCED: HCPCS

## 2025-05-30 PROCEDURE — 6360000004 HC RX CONTRAST MEDICATION: Performed by: UROLOGY

## 2025-05-30 PROCEDURE — 86901 BLOOD TYPING SEROLOGIC RH(D): CPT

## 2025-05-30 PROCEDURE — 82746 ASSAY OF FOLIC ACID SERUM: CPT

## 2025-05-30 PROCEDURE — 30233N1 TRANSFUSION OF NONAUTOLOGOUS RED BLOOD CELLS INTO PERIPHERAL VEIN, PERCUTANEOUS APPROACH: ICD-10-PCS

## 2025-05-30 PROCEDURE — 83550 IRON BINDING TEST: CPT

## 2025-05-30 PROCEDURE — 83880 ASSAY OF NATRIURETIC PEPTIDE: CPT

## 2025-05-30 PROCEDURE — 85018 HEMOGLOBIN: CPT

## 2025-05-30 PROCEDURE — 83690 ASSAY OF LIPASE: CPT

## 2025-05-30 PROCEDURE — 36430 TRANSFUSION BLD/BLD COMPNT: CPT

## 2025-05-30 PROCEDURE — 80053 COMPREHEN METABOLIC PANEL: CPT

## 2025-05-30 PROCEDURE — 82077 ASSAY SPEC XCP UR&BREATH IA: CPT

## 2025-05-30 PROCEDURE — 85045 AUTOMATED RETICULOCYTE COUNT: CPT

## 2025-05-30 PROCEDURE — 82728 ASSAY OF FERRITIN: CPT

## 2025-05-30 PROCEDURE — 82803 BLOOD GASES ANY COMBINATION: CPT

## 2025-05-30 PROCEDURE — 81001 URINALYSIS AUTO W/SCOPE: CPT

## 2025-05-30 PROCEDURE — 93005 ELECTROCARDIOGRAM TRACING: CPT | Performed by: EMERGENCY MEDICINE

## 2025-05-30 PROCEDURE — 80143 DRUG ASSAY ACETAMINOPHEN: CPT

## 2025-05-30 PROCEDURE — 74178 CT ABD&PLV WO CNTR FLWD CNTR: CPT

## 2025-05-30 PROCEDURE — 86923 COMPATIBILITY TEST ELECTRIC: CPT

## 2025-05-30 PROCEDURE — 2580000003 HC RX 258: Performed by: PHYSICIAN ASSISTANT

## 2025-05-30 RX ORDER — ACETAMINOPHEN 325 MG/1
650 TABLET ORAL EVERY 6 HOURS PRN
Status: DISCONTINUED | OUTPATIENT
Start: 2025-05-30 | End: 2025-06-06 | Stop reason: HOSPADM

## 2025-05-30 RX ORDER — DICYCLOMINE HYDROCHLORIDE 10 MG/1
20 CAPSULE ORAL EVERY 6 HOURS PRN
Status: DISCONTINUED | OUTPATIENT
Start: 2025-05-30 | End: 2025-06-06 | Stop reason: HOSPADM

## 2025-05-30 RX ORDER — ACETAMINOPHEN 650 MG/1
650 SUPPOSITORY RECTAL EVERY 6 HOURS PRN
Status: DISCONTINUED | OUTPATIENT
Start: 2025-05-30 | End: 2025-06-06 | Stop reason: HOSPADM

## 2025-05-30 RX ORDER — ONDANSETRON 4 MG/1
4 TABLET, ORALLY DISINTEGRATING ORAL EVERY 8 HOURS PRN
Status: DISCONTINUED | OUTPATIENT
Start: 2025-05-30 | End: 2025-06-06 | Stop reason: HOSPADM

## 2025-05-30 RX ORDER — TRAMADOL HYDROCHLORIDE 50 MG/1
50 TABLET ORAL EVERY 8 HOURS PRN
Status: DISCONTINUED | OUTPATIENT
Start: 2025-05-30 | End: 2025-05-31

## 2025-05-30 RX ORDER — POLYETHYLENE GLYCOL 3350 17 G/17G
17 POWDER, FOR SOLUTION ORAL DAILY PRN
Status: DISCONTINUED | OUTPATIENT
Start: 2025-05-30 | End: 2025-06-06 | Stop reason: HOSPADM

## 2025-05-30 RX ORDER — IOPAMIDOL 755 MG/ML
75 INJECTION, SOLUTION INTRAVASCULAR
Status: COMPLETED | OUTPATIENT
Start: 2025-05-30 | End: 2025-05-30

## 2025-05-30 RX ORDER — SODIUM CHLORIDE 9 MG/ML
INJECTION, SOLUTION INTRAVENOUS PRN
Status: ACTIVE | OUTPATIENT
Start: 2025-05-30 | End: 2025-05-30

## 2025-05-30 RX ORDER — SODIUM CHLORIDE, SODIUM LACTATE, POTASSIUM CHLORIDE, AND CALCIUM CHLORIDE .6; .31; .03; .02 G/100ML; G/100ML; G/100ML; G/100ML
1000 INJECTION, SOLUTION INTRAVENOUS ONCE
Status: COMPLETED | OUTPATIENT
Start: 2025-05-30 | End: 2025-05-30

## 2025-05-30 RX ORDER — BUPRENORPHINE HYDROCHLORIDE AND NALOXONE HYDROCHLORIDE DIHYDRATE 2; .5 MG/1; MG/1
2 TABLET SUBLINGUAL PRN
Status: DISCONTINUED | OUTPATIENT
Start: 2025-05-30 | End: 2025-06-06 | Stop reason: HOSPADM

## 2025-05-30 RX ORDER — ONDANSETRON 2 MG/ML
4 INJECTION INTRAMUSCULAR; INTRAVENOUS EVERY 6 HOURS PRN
Status: DISCONTINUED | OUTPATIENT
Start: 2025-05-30 | End: 2025-06-06 | Stop reason: HOSPADM

## 2025-05-30 RX ORDER — LOPERAMIDE HYDROCHLORIDE 2 MG/1
2 CAPSULE ORAL 4 TIMES DAILY PRN
Status: DISCONTINUED | OUTPATIENT
Start: 2025-05-30 | End: 2025-06-06 | Stop reason: HOSPADM

## 2025-05-30 RX ORDER — METHOCARBAMOL 750 MG/1
750 TABLET, FILM COATED ORAL EVERY 6 HOURS PRN
Status: DISCONTINUED | OUTPATIENT
Start: 2025-05-30 | End: 2025-06-06 | Stop reason: HOSPADM

## 2025-05-30 RX ORDER — TRAZODONE HYDROCHLORIDE 50 MG/1
50 TABLET ORAL NIGHTLY PRN
Status: DISCONTINUED | OUTPATIENT
Start: 2025-05-30 | End: 2025-06-06 | Stop reason: HOSPADM

## 2025-05-30 RX ORDER — CLONIDINE HYDROCHLORIDE 0.1 MG/1
0.1 TABLET ORAL EVERY 6 HOURS PRN
Status: DISCONTINUED | OUTPATIENT
Start: 2025-05-30 | End: 2025-06-06 | Stop reason: HOSPADM

## 2025-05-30 RX ORDER — HYDROXYZINE PAMOATE 25 MG/1
50 CAPSULE ORAL EVERY 8 HOURS PRN
Status: DISCONTINUED | OUTPATIENT
Start: 2025-05-30 | End: 2025-06-06 | Stop reason: HOSPADM

## 2025-05-30 RX ORDER — MAGNESIUM SULFATE IN WATER 40 MG/ML
2000 INJECTION, SOLUTION INTRAVENOUS PRN
Status: DISCONTINUED | OUTPATIENT
Start: 2025-05-30 | End: 2025-06-06 | Stop reason: HOSPADM

## 2025-05-30 RX ORDER — SODIUM CHLORIDE 0.9 % (FLUSH) 0.9 %
5-40 SYRINGE (ML) INJECTION PRN
Status: DISCONTINUED | OUTPATIENT
Start: 2025-05-30 | End: 2025-06-06 | Stop reason: HOSPADM

## 2025-05-30 RX ORDER — POTASSIUM CHLORIDE 1500 MG/1
40 TABLET, EXTENDED RELEASE ORAL PRN
Status: DISCONTINUED | OUTPATIENT
Start: 2025-05-30 | End: 2025-06-06 | Stop reason: HOSPADM

## 2025-05-30 RX ORDER — LIDOCAINE 4 G/G
1 PATCH TOPICAL DAILY
Status: DISCONTINUED | OUTPATIENT
Start: 2025-05-30 | End: 2025-06-06 | Stop reason: HOSPADM

## 2025-05-30 RX ORDER — SODIUM CHLORIDE 0.9 % (FLUSH) 0.9 %
5-40 SYRINGE (ML) INJECTION EVERY 12 HOURS SCHEDULED
Status: DISCONTINUED | OUTPATIENT
Start: 2025-05-30 | End: 2025-06-06 | Stop reason: HOSPADM

## 2025-05-30 RX ORDER — SODIUM CHLORIDE 9 MG/ML
INJECTION, SOLUTION INTRAVENOUS PRN
Status: DISCONTINUED | OUTPATIENT
Start: 2025-05-30 | End: 2025-06-06 | Stop reason: HOSPADM

## 2025-05-30 RX ORDER — POTASSIUM CHLORIDE 7.45 MG/ML
10 INJECTION INTRAVENOUS PRN
Status: DISCONTINUED | OUTPATIENT
Start: 2025-05-30 | End: 2025-06-06 | Stop reason: HOSPADM

## 2025-05-30 RX ADMIN — SODIUM CHLORIDE, SODIUM LACTATE, POTASSIUM CHLORIDE, AND CALCIUM CHLORIDE 1000 ML: .6; .31; .03; .02 INJECTION, SOLUTION INTRAVENOUS at 09:22

## 2025-05-30 RX ADMIN — IOPAMIDOL 75 ML: 755 INJECTION, SOLUTION INTRAVENOUS at 20:33

## 2025-05-30 RX ADMIN — METHOCARBAMOL 750 MG: 750 TABLET ORAL at 23:59

## 2025-05-30 RX ADMIN — METHOCARBAMOL 750 MG: 750 TABLET ORAL at 18:03

## 2025-05-30 RX ADMIN — ACETAMINOPHEN 650 MG: 325 TABLET ORAL at 23:57

## 2025-05-30 RX ADMIN — CLONIDINE HYDROCHLORIDE 0.1 MG: 0.1 TABLET ORAL at 21:10

## 2025-05-30 RX ADMIN — SODIUM CHLORIDE, SODIUM LACTATE, POTASSIUM CHLORIDE, AND CALCIUM CHLORIDE 1000 ML: .6; .31; .03; .02 INJECTION, SOLUTION INTRAVENOUS at 10:01

## 2025-05-30 RX ADMIN — TRAMADOL HYDROCHLORIDE 50 MG: 50 TABLET, COATED ORAL at 18:03

## 2025-05-30 ASSESSMENT — PAIN DESCRIPTION - ONSET
ONSET: ON-GOING

## 2025-05-30 ASSESSMENT — PAIN SCALES - GENERAL
PAINLEVEL_OUTOF10: 0
PAINLEVEL_OUTOF10: 8

## 2025-05-30 ASSESSMENT — PAIN DESCRIPTION - FREQUENCY
FREQUENCY: CONTINUOUS

## 2025-05-30 ASSESSMENT — PAIN DESCRIPTION - DESCRIPTORS
DESCRIPTORS: STABBING

## 2025-05-30 ASSESSMENT — PAIN DESCRIPTION - ORIENTATION
ORIENTATION: LOWER;RIGHT
ORIENTATION: RIGHT;LOWER
ORIENTATION: RIGHT;LOWER

## 2025-05-30 ASSESSMENT — PAIN DESCRIPTION - PAIN TYPE
TYPE: CHRONIC PAIN
TYPE: ACUTE PAIN
TYPE: CHRONIC PAIN

## 2025-05-30 ASSESSMENT — PAIN DESCRIPTION - LOCATION
LOCATION: BACK

## 2025-05-30 NOTE — ED PROVIDER NOTES
05/30/25     This patient was seen by the advanced practice provider. I have seen and examined the patient, agree with the workup, evaluation, management and diagnosis. Care plan has been discussed.      I have reviewed the ECG and concur.       Assessment reveals 49 y.o. male ED with complaint of altered mental status.  Patient was found unresponsive by EMS and given Narcan with good effect.  On arrival patient is sleepy however alert.  Vital signs are stable.  Heart is regular rate and rhythm and lungs are clear.  There is no evidence of track marks.  Abdomen is soft nontender.  Patient has no peripheral edema.  Labs remarkable for hemoglobin of 4.  Patient will be provided 2 units of packed red blood cells.  UDS positive for cocaine, fentanyl, benzodiazepines.  He has not required repeat dosing of Narcan here in the emergency department.  Will discuss case with hospital medicine for admission.    Isaac Carlson MD  Emergency Medicine  [facility]      Isaac Carlson MD  05/30/25 1593    
proceeded with transfusing 2 units of blood.  Blood pressure responded appropriately.  EKG is normal sinus rhythm.  Normal renal function.  Normal AST and ALT and bilirubin.  High-sensitivity troponin is 18 and 19.  proBNP is 359.  Urinalysis with 21-50 red blood cells and 10-20 white blood cells.  Urine culture sent.  Patient will require admission for further management.    Is this patient to be included in the SEP-1 core measure? No Exclusion criteria - the patient is NOT to be included for SEP-1 Core Measure due to: 2+ SIRS criteria are not met    Medical Decision Making  Amount and/or Complexity of Data Reviewed  Labs: ordered.  Radiology: ordered.  ECG/medicine tests: ordered.    Risk  Prescription drug management.  Decision regarding hospitalization.        This patient was also evaluated by the attending physician. All care plans were discussed and agreed upon.    Clinical Impression     1. Accidental drug overdose, initial encounter    2. Anemia, unspecified type        Disposition     PATIENT REFERRED TO:  No follow-up provider specified.    DISCHARGE MEDICATIONS:  New Prescriptions    No medications on file       DISPOSITION Decision To Admit 05/30/2025 12:31:48 PM   DISPOSITION CONDITION Stable             Diagnostic Results and Other Data     RADIOLOGY:  XR CHEST PORTABLE   Final Result   1. No airspace disease.      Electronically signed by Lex Smith          LABS:   Results for orders placed or performed during the hospital encounter of 05/30/25   CBC with Auto Differential   Result Value Ref Range    WBC 7.1 4.0 - 11.0 K/uL    RBC 2.80 (L) 4.20 - 5.90 M/uL    Hemoglobin 4.7 (LL) 13.5 - 17.5 g/dL    Hematocrit 15.8 (LL) 40.5 - 52.5 %    MCV 56.4 (L) 80.0 - 100.0 fL    MCH 16.8 (L) 26.0 - 34.0 pg    MCHC 29.8 (L) 31.0 - 36.0 g/dL    RDW 19.0 (H) 12.4 - 15.4 %    Platelets 432 135 - 450 K/uL    MPV 7.8 5.0 - 10.5 fL    Neutrophils % 79.2 %    Lymphocytes % 9.1 %    Monocytes % 7.8 %

## 2025-05-30 NOTE — H&P
84 83   Resp: 12 18 14 18   Temp:  98.4 °F (36.9 °C) 98.2 °F (36.8 °C) 97.3 °F (36.3 °C)   TempSrc:  Oral     SpO2: 97% 100% 96% 97%   Weight:       Height:           Personally Reviewed Medications Prior to Admission     Prior to Admission medications    Medication Sig Start Date End Date Taking? Authorizing Provider   ibuprofen (ADVIL;MOTRIN) 600 MG tablet Take 1 tablet by mouth 3 times daily as needed for Pain 9/27/21   Jacques Chaney MD   acetaminophen (APAP EXTRA STRENGTH) 500 MG tablet Take 2 tablets by mouth every 6 hours as needed for Pain 9/11/19 7/27/21  Dirk Staley MD       Physical Exam:    Physical Exam  Constitutional:       General: He is not in acute distress.     Appearance: Normal appearance. He is not toxic-appearing.   HENT:      Head: Normocephalic and atraumatic.   Eyes:      Extraocular Movements: Extraocular movements intact.   Cardiovascular:      Rate and Rhythm: Normal rate and regular rhythm.      Pulses: Normal pulses.      Heart sounds: Normal heart sounds.   Pulmonary:      Effort: Pulmonary effort is normal.      Breath sounds: Normal breath sounds.   Abdominal:      General: Abdomen is flat. Bowel sounds are normal. There is no distension.      Palpations: Abdomen is soft.      Tenderness: There is no abdominal tenderness.   Musculoskeletal:         General: Normal range of motion.      Cervical back: Normal range of motion.      Right lower leg: Edema present.      Left lower leg: Edema present.   Skin:     General: Skin is warm and dry.   Neurological:      General: No focal deficit present.      Mental Status: He is alert and oriented to person, place, and time.   Psychiatric:         Mood and Affect: Mood normal.          Past Medical History:   PMHx   Past Medical History:   Diagnosis Date    Affective disorder     Diabetes mellitus (HCC)     Hepatitis C     Homeless     Knee pain     Osteoarthritis     Overdose     Pneumonia     Sciatica      PSHX:  has a past surgical

## 2025-05-31 LAB
ANION GAP SERPL CALCULATED.3IONS-SCNC: 11 MMOL/L (ref 3–16)
BACTERIA UR CULT: NORMAL
BASOPHILS # BLD: 0 K/UL (ref 0–0.2)
BASOPHILS NFR BLD: 0.5 %
BLOOD BANK DISPENSE STATUS: NORMAL
BLOOD BANK PRODUCT CODE: NORMAL
BPU ID: NORMAL
BUN SERPL-MCNC: 20 MG/DL (ref 7–20)
CALCIUM SERPL-MCNC: 8.9 MG/DL (ref 8.3–10.6)
CHLORIDE SERPL-SCNC: 100 MMOL/L (ref 99–110)
CO2 SERPL-SCNC: 27 MMOL/L (ref 21–32)
CREAT SERPL-MCNC: 1.2 MG/DL (ref 0.9–1.3)
DEPRECATED RDW RBC AUTO: 24.7 % (ref 12.4–15.4)
DESCRIPTION BLOOD BANK: NORMAL
EOSINOPHIL # BLD: 0.1 K/UL (ref 0–0.6)
EOSINOPHIL NFR BLD: 1.3 %
FOLATE SERPL-MCNC: 12.1 NG/ML (ref 4.78–24.2)
GFR SERPLBLD CREATININE-BSD FMLA CKD-EPI: 74 ML/MIN/{1.73_M2}
GLUCOSE SERPL-MCNC: 97 MG/DL (ref 70–99)
HCT VFR BLD AUTO: 21.2 % (ref 40.5–52.5)
HCT VFR BLD AUTO: 21.8 % (ref 40.5–52.5)
HCT VFR BLD AUTO: 22.9 % (ref 40.5–52.5)
HGB BLD-MCNC: 6.7 G/DL (ref 13.5–17.5)
HGB BLD-MCNC: 7 G/DL (ref 13.5–17.5)
HGB BLD-MCNC: 7.2 G/DL (ref 13.5–17.5)
IRON SATN MFR SERPL: 3 % (ref 20–50)
IRON SERPL-MCNC: 11 UG/DL (ref 59–158)
LYMPHOCYTES # BLD: 1.1 K/UL (ref 1–5.1)
LYMPHOCYTES NFR BLD: 11.7 %
MCH RBC QN AUTO: 19.2 PG (ref 26–34)
MCHC RBC AUTO-ENTMCNC: 31.5 G/DL (ref 31–36)
MCV RBC AUTO: 60.9 FL (ref 80–100)
MONOCYTES # BLD: 0.8 K/UL (ref 0–1.3)
MONOCYTES NFR BLD: 8.3 %
NEUTROPHILS # BLD: 7.6 K/UL (ref 1.7–7.7)
NEUTROPHILS NFR BLD: 78.2 %
PLATELET # BLD AUTO: 497 K/UL (ref 135–450)
PMV BLD AUTO: 7.9 FL (ref 5–10.5)
POTASSIUM SERPL-SCNC: 3.6 MMOL/L (ref 3.5–5.1)
RBC # BLD AUTO: 3.48 M/UL (ref 4.2–5.9)
SODIUM SERPL-SCNC: 138 MMOL/L (ref 136–145)
TIBC SERPL-MCNC: 344 UG/DL (ref 260–445)
VIT B12 SERPL-MCNC: 667 PG/ML (ref 211–911)
WBC # BLD AUTO: 9.7 K/UL (ref 4–11)

## 2025-05-31 PROCEDURE — 2500000003 HC RX 250 WO HCPCS

## 2025-05-31 PROCEDURE — 2060000000 HC ICU INTERMEDIATE R&B

## 2025-05-31 PROCEDURE — 80048 BASIC METABOLIC PNL TOTAL CA: CPT

## 2025-05-31 PROCEDURE — 85018 HEMOGLOBIN: CPT

## 2025-05-31 PROCEDURE — 85014 HEMATOCRIT: CPT

## 2025-05-31 PROCEDURE — 36430 TRANSFUSION BLD/BLD COMPNT: CPT

## 2025-05-31 PROCEDURE — 36415 COLL VENOUS BLD VENIPUNCTURE: CPT

## 2025-05-31 PROCEDURE — 6370000000 HC RX 637 (ALT 250 FOR IP)

## 2025-05-31 PROCEDURE — 85025 COMPLETE CBC W/AUTO DIFF WBC: CPT

## 2025-05-31 RX ORDER — SODIUM CHLORIDE 9 MG/ML
INJECTION, SOLUTION INTRAVENOUS PRN
Status: DISCONTINUED | OUTPATIENT
Start: 2025-05-31 | End: 2025-06-06 | Stop reason: HOSPADM

## 2025-05-31 RX ORDER — TRAMADOL HYDROCHLORIDE 50 MG/1
50 TABLET ORAL EVERY 6 HOURS PRN
Status: DISCONTINUED | OUTPATIENT
Start: 2025-05-31 | End: 2025-06-02

## 2025-05-31 RX ADMIN — TRAMADOL HYDROCHLORIDE 50 MG: 50 TABLET, COATED ORAL at 21:07

## 2025-05-31 RX ADMIN — SODIUM CHLORIDE, PRESERVATIVE FREE 10 ML: 5 INJECTION INTRAVENOUS at 08:03

## 2025-05-31 RX ADMIN — METHOCARBAMOL 750 MG: 750 TABLET ORAL at 08:02

## 2025-05-31 RX ADMIN — METHOCARBAMOL 750 MG: 750 TABLET ORAL at 19:51

## 2025-05-31 RX ADMIN — TRAMADOL HYDROCHLORIDE 50 MG: 50 TABLET, COATED ORAL at 10:09

## 2025-05-31 RX ADMIN — METHOCARBAMOL 750 MG: 750 TABLET ORAL at 14:06

## 2025-05-31 RX ADMIN — SODIUM CHLORIDE, PRESERVATIVE FREE 10 ML: 5 INJECTION INTRAVENOUS at 19:52

## 2025-05-31 RX ADMIN — TRAMADOL HYDROCHLORIDE 50 MG: 50 TABLET, COATED ORAL at 15:24

## 2025-05-31 RX ADMIN — TRAMADOL HYDROCHLORIDE 50 MG: 50 TABLET, COATED ORAL at 01:59

## 2025-05-31 ASSESSMENT — PAIN - FUNCTIONAL ASSESSMENT

## 2025-05-31 ASSESSMENT — PAIN DESCRIPTION - FREQUENCY
FREQUENCY: CONTINUOUS

## 2025-05-31 ASSESSMENT — PAIN DESCRIPTION - ONSET
ONSET: ON-GOING

## 2025-05-31 ASSESSMENT — PAIN SCALES - GENERAL
PAINLEVEL_OUTOF10: 5
PAINLEVEL_OUTOF10: 8
PAINLEVEL_OUTOF10: 8
PAINLEVEL_OUTOF10: 7
PAINLEVEL_OUTOF10: 7
PAINLEVEL_OUTOF10: 6
PAINLEVEL_OUTOF10: 7
PAINLEVEL_OUTOF10: 8
PAINLEVEL_OUTOF10: 8
PAINLEVEL_OUTOF10: 4
PAINLEVEL_OUTOF10: 4
PAINLEVEL_OUTOF10: 0
PAINLEVEL_OUTOF10: 9
PAINLEVEL_OUTOF10: 8

## 2025-05-31 ASSESSMENT — PAIN DESCRIPTION - PAIN TYPE
TYPE: ACUTE PAIN

## 2025-05-31 ASSESSMENT — PAIN DESCRIPTION - ORIENTATION
ORIENTATION: RIGHT;MID
ORIENTATION: RIGHT
ORIENTATION: MID;POSTERIOR
ORIENTATION: RIGHT
ORIENTATION: RIGHT;MID
ORIENTATION: MID
ORIENTATION: RIGHT;MID;POSTERIOR

## 2025-05-31 ASSESSMENT — PAIN DESCRIPTION - LOCATION
LOCATION: BACK
LOCATION: FLANK
LOCATION: ARM;BACK
LOCATION: BACK
LOCATION: BACK
LOCATION: BACK;ARM
LOCATION: BACK

## 2025-05-31 ASSESSMENT — PAIN DESCRIPTION - DESCRIPTORS
DESCRIPTORS: ACHING
DESCRIPTORS: ACHING;SORE;DISCOMFORT;SHARP
DESCRIPTORS: ACHING
DESCRIPTORS: ACHING

## 2025-06-01 ENCOUNTER — ANESTHESIA EVENT (OUTPATIENT)
Dept: OPERATING ROOM | Age: 50
End: 2025-06-01
Payer: MEDICAID

## 2025-06-01 ENCOUNTER — ANESTHESIA (OUTPATIENT)
Dept: OPERATING ROOM | Age: 50
End: 2025-06-01
Payer: MEDICAID

## 2025-06-01 LAB
ANION GAP SERPL CALCULATED.3IONS-SCNC: 11 MMOL/L (ref 3–16)
BASOPHILS # BLD: 0 K/UL (ref 0–0.2)
BASOPHILS NFR BLD: 0.3 %
BUN SERPL-MCNC: 16 MG/DL (ref 7–20)
CALCIUM SERPL-MCNC: 8.6 MG/DL (ref 8.3–10.6)
CHLORIDE SERPL-SCNC: 101 MMOL/L (ref 99–110)
CO2 SERPL-SCNC: 25 MMOL/L (ref 21–32)
CREAT SERPL-MCNC: 1 MG/DL (ref 0.9–1.3)
DEPRECATED RDW RBC AUTO: 27.1 % (ref 12.4–15.4)
EOSINOPHIL # BLD: 0 K/UL (ref 0–0.6)
EOSINOPHIL NFR BLD: 0.3 %
GFR SERPLBLD CREATININE-BSD FMLA CKD-EPI: >90 ML/MIN/{1.73_M2}
GLUCOSE SERPL-MCNC: 106 MG/DL (ref 70–99)
HCT VFR BLD AUTO: 23.4 % (ref 40.5–52.5)
HCT VFR BLD AUTO: 26.4 % (ref 40.5–52.5)
HGB BLD-MCNC: 7.3 G/DL (ref 13.5–17.5)
HGB BLD-MCNC: 8.2 G/DL (ref 13.5–17.5)
LYMPHOCYTES # BLD: 0.9 K/UL (ref 1–5.1)
LYMPHOCYTES NFR BLD: 9.5 %
MAGNESIUM SERPL-MCNC: 1.49 MG/DL (ref 1.8–2.4)
MCH RBC QN AUTO: 19.7 PG (ref 26–34)
MCHC RBC AUTO-ENTMCNC: 31.2 G/DL (ref 31–36)
MCV RBC AUTO: 63.2 FL (ref 80–100)
MONOCYTES # BLD: 0.8 K/UL (ref 0–1.3)
MONOCYTES NFR BLD: 8.3 %
NEUTROPHILS # BLD: 7.7 K/UL (ref 1.7–7.7)
NEUTROPHILS NFR BLD: 81.6 %
PLATELET # BLD AUTO: 394 K/UL (ref 135–450)
PMV BLD AUTO: 7.9 FL (ref 5–10.5)
POTASSIUM SERPL-SCNC: 3.4 MMOL/L (ref 3.5–5.1)
RBC # BLD AUTO: 3.71 M/UL (ref 4.2–5.9)
SODIUM SERPL-SCNC: 137 MMOL/L (ref 136–145)
WBC # BLD AUTO: 9.4 K/UL (ref 4–11)

## 2025-06-01 PROCEDURE — 36415 COLL VENOUS BLD VENIPUNCTURE: CPT

## 2025-06-01 PROCEDURE — 3700000001 HC ADD 15 MINUTES (ANESTHESIA): Performed by: UROLOGY

## 2025-06-01 PROCEDURE — 2500000003 HC RX 250 WO HCPCS

## 2025-06-01 PROCEDURE — 6370000000 HC RX 637 (ALT 250 FOR IP)

## 2025-06-01 PROCEDURE — 85025 COMPLETE CBC W/AUTO DIFF WBC: CPT

## 2025-06-01 PROCEDURE — 6360000002 HC RX W HCPCS

## 2025-06-01 PROCEDURE — 85018 HEMOGLOBIN: CPT

## 2025-06-01 PROCEDURE — 1200000000 HC SEMI PRIVATE

## 2025-06-01 PROCEDURE — 2500000003 HC RX 250 WO HCPCS: Performed by: UROLOGY

## 2025-06-01 PROCEDURE — 7100000001 HC PACU RECOVERY - ADDTL 15 MIN: Performed by: UROLOGY

## 2025-06-01 PROCEDURE — 6360000002 HC RX W HCPCS: Performed by: ANESTHESIOLOGY

## 2025-06-01 PROCEDURE — 6360000002 HC RX W HCPCS: Performed by: NURSE PRACTITIONER

## 2025-06-01 PROCEDURE — 2580000003 HC RX 258: Performed by: UROLOGY

## 2025-06-01 PROCEDURE — 7100000000 HC PACU RECOVERY - FIRST 15 MIN: Performed by: UROLOGY

## 2025-06-01 PROCEDURE — 3600000014 HC SURGERY LEVEL 4 ADDTL 15MIN: Performed by: UROLOGY

## 2025-06-01 PROCEDURE — 3600000004 HC SURGERY LEVEL 4 BASE: Performed by: UROLOGY

## 2025-06-01 PROCEDURE — 3700000000 HC ANESTHESIA ATTENDED CARE: Performed by: UROLOGY

## 2025-06-01 PROCEDURE — 83735 ASSAY OF MAGNESIUM: CPT

## 2025-06-01 PROCEDURE — 6360000002 HC RX W HCPCS: Performed by: UROLOGY

## 2025-06-01 PROCEDURE — 2500000003 HC RX 250 WO HCPCS: Performed by: ANESTHESIOLOGY

## 2025-06-01 PROCEDURE — 6370000000 HC RX 637 (ALT 250 FOR IP): Performed by: UROLOGY

## 2025-06-01 PROCEDURE — 2580000003 HC RX 258: Performed by: ANESTHESIOLOGY

## 2025-06-01 PROCEDURE — 85014 HEMATOCRIT: CPT

## 2025-06-01 PROCEDURE — 80048 BASIC METABOLIC PNL TOTAL CA: CPT

## 2025-06-01 PROCEDURE — 0TBB8ZZ EXCISION OF BLADDER, VIA NATURAL OR ARTIFICIAL OPENING ENDOSCOPIC: ICD-10-PCS | Performed by: UROLOGY

## 2025-06-01 PROCEDURE — 2709999900 HC NON-CHARGEABLE SUPPLY: Performed by: UROLOGY

## 2025-06-01 PROCEDURE — 2720000010 HC SURG SUPPLY STERILE: Performed by: UROLOGY

## 2025-06-01 RX ORDER — HYDRALAZINE HYDROCHLORIDE 20 MG/ML
INJECTION INTRAMUSCULAR; INTRAVENOUS
Status: DISCONTINUED | OUTPATIENT
Start: 2025-06-01 | End: 2025-06-01 | Stop reason: SDUPTHER

## 2025-06-01 RX ORDER — PROCHLORPERAZINE EDISYLATE 5 MG/ML
5 INJECTION INTRAMUSCULAR; INTRAVENOUS
Status: DISCONTINUED | OUTPATIENT
Start: 2025-06-01 | End: 2025-06-01

## 2025-06-01 RX ORDER — MIDAZOLAM HYDROCHLORIDE 1 MG/ML
INJECTION, SOLUTION INTRAMUSCULAR; INTRAVENOUS
Status: DISCONTINUED | OUTPATIENT
Start: 2025-06-01 | End: 2025-06-01 | Stop reason: SDUPTHER

## 2025-06-01 RX ORDER — DIPHENHYDRAMINE HYDROCHLORIDE 50 MG/ML
12.5 INJECTION, SOLUTION INTRAMUSCULAR; INTRAVENOUS
Status: DISCONTINUED | OUTPATIENT
Start: 2025-06-01 | End: 2025-06-01

## 2025-06-01 RX ORDER — NALOXONE HYDROCHLORIDE 0.4 MG/ML
INJECTION, SOLUTION INTRAMUSCULAR; INTRAVENOUS; SUBCUTANEOUS PRN
Status: DISCONTINUED | OUTPATIENT
Start: 2025-06-01 | End: 2025-06-01

## 2025-06-01 RX ORDER — MAGNESIUM HYDROXIDE 1200 MG/15ML
LIQUID ORAL CONTINUOUS PRN
Status: COMPLETED | OUTPATIENT
Start: 2025-06-01 | End: 2025-06-01

## 2025-06-01 RX ORDER — MORPHINE SULFATE 2 MG/ML
2 INJECTION, SOLUTION INTRAMUSCULAR; INTRAVENOUS
Status: DISCONTINUED | OUTPATIENT
Start: 2025-06-01 | End: 2025-06-06 | Stop reason: HOSPADM

## 2025-06-01 RX ORDER — ONDANSETRON 2 MG/ML
4 INJECTION INTRAMUSCULAR; INTRAVENOUS
Status: DISCONTINUED | OUTPATIENT
Start: 2025-06-01 | End: 2025-06-01

## 2025-06-01 RX ORDER — HYDRALAZINE HYDROCHLORIDE 20 MG/ML
10 INJECTION INTRAMUSCULAR; INTRAVENOUS
Status: DISCONTINUED | OUTPATIENT
Start: 2025-06-01 | End: 2025-06-01

## 2025-06-01 RX ORDER — HYDROMORPHONE HYDROCHLORIDE 1 MG/ML
0.5 INJECTION, SOLUTION INTRAMUSCULAR; INTRAVENOUS; SUBCUTANEOUS EVERY 5 MIN PRN
Status: DISCONTINUED | OUTPATIENT
Start: 2025-06-01 | End: 2025-06-01

## 2025-06-01 RX ORDER — POTASSIUM CHLORIDE 7.45 MG/ML
10 INJECTION INTRAVENOUS
Status: DISCONTINUED | OUTPATIENT
Start: 2025-06-01 | End: 2025-06-01

## 2025-06-01 RX ORDER — PROPOFOL 10 MG/ML
INJECTION, EMULSION INTRAVENOUS
Status: DISCONTINUED | OUTPATIENT
Start: 2025-06-01 | End: 2025-06-01 | Stop reason: SDUPTHER

## 2025-06-01 RX ORDER — HYDROMORPHONE HYDROCHLORIDE 2 MG/ML
INJECTION, SOLUTION INTRAMUSCULAR; INTRAVENOUS; SUBCUTANEOUS
Status: DISCONTINUED | OUTPATIENT
Start: 2025-06-01 | End: 2025-06-01 | Stop reason: SDUPTHER

## 2025-06-01 RX ORDER — SODIUM CHLORIDE 9 MG/ML
INJECTION, SOLUTION INTRAVENOUS
Status: DISCONTINUED | OUTPATIENT
Start: 2025-06-01 | End: 2025-06-01 | Stop reason: SDUPTHER

## 2025-06-01 RX ORDER — ONDANSETRON 2 MG/ML
INJECTION INTRAMUSCULAR; INTRAVENOUS
Status: DISCONTINUED | OUTPATIENT
Start: 2025-06-01 | End: 2025-06-01 | Stop reason: SDUPTHER

## 2025-06-01 RX ORDER — ROCURONIUM BROMIDE 10 MG/ML
INJECTION, SOLUTION INTRAVENOUS
Status: DISCONTINUED | OUTPATIENT
Start: 2025-06-01 | End: 2025-06-01 | Stop reason: SDUPTHER

## 2025-06-01 RX ORDER — FAMOTIDINE 10 MG/ML
INJECTION, SOLUTION INTRAVENOUS
Status: DISCONTINUED | OUTPATIENT
Start: 2025-06-01 | End: 2025-06-01 | Stop reason: SDUPTHER

## 2025-06-01 RX ORDER — SODIUM CHLORIDE 0.9 % (FLUSH) 0.9 %
5-40 SYRINGE (ML) INJECTION EVERY 12 HOURS SCHEDULED
Status: DISCONTINUED | OUTPATIENT
Start: 2025-06-01 | End: 2025-06-01

## 2025-06-01 RX ORDER — HYDROMORPHONE HYDROCHLORIDE 1 MG/ML
0.25 INJECTION, SOLUTION INTRAMUSCULAR; INTRAVENOUS; SUBCUTANEOUS EVERY 5 MIN PRN
Status: DISCONTINUED | OUTPATIENT
Start: 2025-06-01 | End: 2025-06-01

## 2025-06-01 RX ORDER — MEPERIDINE HYDROCHLORIDE 25 MG/ML
12.5 INJECTION INTRAMUSCULAR; INTRAVENOUS; SUBCUTANEOUS AS NEEDED
Status: DISCONTINUED | OUTPATIENT
Start: 2025-06-01 | End: 2025-06-01

## 2025-06-01 RX ORDER — FENTANYL CITRATE 50 UG/ML
INJECTION, SOLUTION INTRAMUSCULAR; INTRAVENOUS
Status: DISCONTINUED | OUTPATIENT
Start: 2025-06-01 | End: 2025-06-01 | Stop reason: SDUPTHER

## 2025-06-01 RX ORDER — SODIUM CHLORIDE 0.9 % (FLUSH) 0.9 %
5-40 SYRINGE (ML) INJECTION PRN
Status: DISCONTINUED | OUTPATIENT
Start: 2025-06-01 | End: 2025-06-01

## 2025-06-01 RX ORDER — SODIUM CHLORIDE 9 MG/ML
INJECTION, SOLUTION INTRAVENOUS PRN
Status: DISCONTINUED | OUTPATIENT
Start: 2025-06-01 | End: 2025-06-01

## 2025-06-01 RX ORDER — CEFAZOLIN SODIUM 1 G/3ML
INJECTION, POWDER, FOR SOLUTION INTRAMUSCULAR; INTRAVENOUS
Status: DISCONTINUED | OUTPATIENT
Start: 2025-06-01 | End: 2025-06-01 | Stop reason: SDUPTHER

## 2025-06-01 RX ORDER — SUCCINYLCHOLINE/SOD CL,ISO/PF 200MG/10ML
SYRINGE (ML) INTRAVENOUS
Status: DISCONTINUED | OUTPATIENT
Start: 2025-06-01 | End: 2025-06-01 | Stop reason: SDUPTHER

## 2025-06-01 RX ORDER — HYDROMORPHONE HYDROCHLORIDE 1 MG/ML
0.5 INJECTION, SOLUTION INTRAMUSCULAR; INTRAVENOUS; SUBCUTANEOUS ONCE
Status: COMPLETED | OUTPATIENT
Start: 2025-06-01 | End: 2025-06-01

## 2025-06-01 RX ORDER — MAGNESIUM SULFATE IN WATER 40 MG/ML
4000 INJECTION, SOLUTION INTRAVENOUS ONCE
Status: COMPLETED | OUTPATIENT
Start: 2025-06-01 | End: 2025-06-01

## 2025-06-01 RX ADMIN — HYDROMORPHONE HYDROCHLORIDE 0.5 MG: 1 INJECTION, SOLUTION INTRAMUSCULAR; INTRAVENOUS; SUBCUTANEOUS at 13:23

## 2025-06-01 RX ADMIN — SODIUM CHLORIDE, PRESERVATIVE FREE 10 ML: 5 INJECTION INTRAVENOUS at 20:27

## 2025-06-01 RX ADMIN — FAMOTIDINE 20 MG: 10 INJECTION, SOLUTION INTRAVENOUS at 11:49

## 2025-06-01 RX ADMIN — FENTANYL CITRATE 100 MCG: 50 INJECTION, SOLUTION INTRAMUSCULAR; INTRAVENOUS at 12:33

## 2025-06-01 RX ADMIN — ONDANSETRON 4 MG: 2 INJECTION, SOLUTION INTRAMUSCULAR; INTRAVENOUS at 04:43

## 2025-06-01 RX ADMIN — CEFAZOLIN SODIUM 2 G: 1 POWDER, FOR SOLUTION INTRAMUSCULAR; INTRAVENOUS at 12:01

## 2025-06-01 RX ADMIN — MORPHINE SULFATE 2 MG: 2 INJECTION, SOLUTION INTRAMUSCULAR; INTRAVENOUS at 04:54

## 2025-06-01 RX ADMIN — HYDROMORPHONE HYDROCHLORIDE 0.5 MG: 1 INJECTION, SOLUTION INTRAMUSCULAR; INTRAVENOUS; SUBCUTANEOUS at 15:28

## 2025-06-01 RX ADMIN — FENTANYL CITRATE 100 MCG: 50 INJECTION, SOLUTION INTRAMUSCULAR; INTRAVENOUS at 11:49

## 2025-06-01 RX ADMIN — HYDROMORPHONE HYDROCHLORIDE 1 MG: 2 INJECTION, SOLUTION INTRAMUSCULAR; INTRAVENOUS; SUBCUTANEOUS at 12:57

## 2025-06-01 RX ADMIN — SODIUM CHLORIDE: 9 INJECTION, SOLUTION INTRAVENOUS at 11:42

## 2025-06-01 RX ADMIN — METHOCARBAMOL 750 MG: 750 TABLET ORAL at 02:23

## 2025-06-01 RX ADMIN — HYDRALAZINE HYDROCHLORIDE 10 MG: 20 INJECTION INTRAMUSCULAR; INTRAVENOUS at 12:18

## 2025-06-01 RX ADMIN — IRON SUCROSE 300 MG: 20 INJECTION, SOLUTION INTRAVENOUS at 15:42

## 2025-06-01 RX ADMIN — PROPOFOL 200 MG: 10 INJECTION, EMULSION INTRAVENOUS at 11:51

## 2025-06-01 RX ADMIN — MIDAZOLAM HYDROCHLORIDE 2 MG: 1 INJECTION, SOLUTION INTRAMUSCULAR; INTRAVENOUS at 12:59

## 2025-06-01 RX ADMIN — HYDROMORPHONE HYDROCHLORIDE 1 MG: 2 INJECTION, SOLUTION INTRAMUSCULAR; INTRAVENOUS; SUBCUTANEOUS at 12:55

## 2025-06-01 RX ADMIN — HYDROMORPHONE HYDROCHLORIDE 0.5 MG: 1 INJECTION, SOLUTION INTRAMUSCULAR; INTRAVENOUS; SUBCUTANEOUS at 13:10

## 2025-06-01 RX ADMIN — SUGAMMADEX 200 MG: 100 INJECTION, SOLUTION INTRAVENOUS at 12:51

## 2025-06-01 RX ADMIN — MAGNESIUM SULFATE HEPTAHYDRATE 4000 MG: 40 INJECTION, SOLUTION INTRAVENOUS at 08:57

## 2025-06-01 RX ADMIN — POTASSIUM CHLORIDE 20 MEQ: 1500 TABLET, EXTENDED RELEASE ORAL at 06:55

## 2025-06-01 RX ADMIN — Medication 140 MG: at 11:51

## 2025-06-01 RX ADMIN — ROCURONIUM BROMIDE 10 MG: 10 INJECTION, SOLUTION INTRAVENOUS at 11:51

## 2025-06-01 RX ADMIN — SODIUM CHLORIDE, PRESERVATIVE FREE 10 ML: 5 INJECTION INTRAVENOUS at 08:42

## 2025-06-01 RX ADMIN — MEPERIDINE HYDROCHLORIDE 12.5 MG: 25 INJECTION INTRAMUSCULAR; INTRAVENOUS; SUBCUTANEOUS at 13:08

## 2025-06-01 RX ADMIN — TRAMADOL HYDROCHLORIDE 50 MG: 50 TABLET, COATED ORAL at 20:27

## 2025-06-01 RX ADMIN — ROCURONIUM BROMIDE 40 MG: 10 INJECTION, SOLUTION INTRAVENOUS at 11:56

## 2025-06-01 RX ADMIN — ONDANSETRON 4 MG: 2 INJECTION, SOLUTION INTRAMUSCULAR; INTRAVENOUS at 11:49

## 2025-06-01 ASSESSMENT — PAIN DESCRIPTION - DESCRIPTORS
DESCRIPTORS: DISCOMFORT
DESCRIPTORS: ACHING
DESCRIPTORS: ACHING
DESCRIPTORS: DISCOMFORT
DESCRIPTORS: DISCOMFORT
DESCRIPTORS: ACHING
DESCRIPTORS: DISCOMFORT
DESCRIPTORS: ACHING

## 2025-06-01 ASSESSMENT — PAIN DESCRIPTION - PAIN TYPE
TYPE: ACUTE PAIN

## 2025-06-01 ASSESSMENT — PAIN DESCRIPTION - LOCATION
LOCATION: PENIS
LOCATION: BACK
LOCATION: PENIS
LOCATION: PENIS
LOCATION: BACK
LOCATION: PENIS

## 2025-06-01 ASSESSMENT — PAIN DESCRIPTION - ONSET
ONSET: ON-GOING

## 2025-06-01 ASSESSMENT — PAIN - FUNCTIONAL ASSESSMENT
PAIN_FUNCTIONAL_ASSESSMENT: PREVENTS OR INTERFERES SOME ACTIVE ACTIVITIES AND ADLS
PAIN_FUNCTIONAL_ASSESSMENT: ACTIVITIES ARE NOT PREVENTED
PAIN_FUNCTIONAL_ASSESSMENT: PREVENTS OR INTERFERES SOME ACTIVE ACTIVITIES AND ADLS
PAIN_FUNCTIONAL_ASSESSMENT: PREVENTS OR INTERFERES SOME ACTIVE ACTIVITIES AND ADLS

## 2025-06-01 ASSESSMENT — PAIN DESCRIPTION - ORIENTATION
ORIENTATION: MID
ORIENTATION: MID;RIGHT
ORIENTATION: MID

## 2025-06-01 ASSESSMENT — PAIN DESCRIPTION - FREQUENCY
FREQUENCY: CONTINUOUS
FREQUENCY: INTERMITTENT
FREQUENCY: CONTINUOUS
FREQUENCY: CONTINUOUS

## 2025-06-01 ASSESSMENT — PAIN SCALES - GENERAL
PAINLEVEL_OUTOF10: 6
PAINLEVEL_OUTOF10: 8
PAINLEVEL_OUTOF10: 8
PAINLEVEL_OUTOF10: 5
PAINLEVEL_OUTOF10: 10
PAINLEVEL_OUTOF10: 8
PAINLEVEL_OUTOF10: 7
PAINLEVEL_OUTOF10: 6
PAINLEVEL_OUTOF10: 8
PAINLEVEL_OUTOF10: 7
PAINLEVEL_OUTOF10: 10
PAINLEVEL_OUTOF10: 7
PAINLEVEL_OUTOF10: 8

## 2025-06-01 ASSESSMENT — PAIN SCALES - WONG BAKER
WONGBAKER_NUMERICALRESPONSE: HURTS A LITTLE BIT
WONGBAKER_NUMERICALRESPONSE: NO HURT
WONGBAKER_NUMERICALRESPONSE: HURTS A LITTLE BIT
WONGBAKER_NUMERICALRESPONSE: HURTS WORST
WONGBAKER_NUMERICALRESPONSE: NO HURT
WONGBAKER_NUMERICALRESPONSE: HURTS LITTLE MORE
WONGBAKER_NUMERICALRESPONSE: HURTS LITTLE MORE

## 2025-06-01 NOTE — OP NOTE
[address] [address]                            OPERATIVE REPORT      PATIENT NAME: BERTO LIND                  : 1975  MED REC NO: [id number]                      ROOM: [id number]  ACCOUNT NO: [id number]                       ADMIT DATE: 2025  PROVIDER: Ej Melvin MD      DATE OF PROCEDURE:  2025    SURGEON:  Ej Melvin MD    PREOPERATIVE DIAGNOSIS:  Locally advanced bladder cancer with bilateral hydronephrosis.    POSTOPERATIVE DIAGNOSIS:  Locally advanced bladder cancer with bilateral hydronephrosis.    PROCEDURE PERFORMED:  A wired bipolar TURBT.    INDICATION FOR PROCEDURE:  Mr. Lind is a 49-year-old gentleman who is homeless unfortunately.  He was found down in a [employer] bathroom with a hemoglobin of 4.7.  He has been resuscitated and he has missed multiple appointments with  Urology for his large bladder tumor.  CT scan reveals the majority of his bladder replaced with tumor and bilateral hydronephrosis.  He continues to bleed.  He is therefore added on today for the aforementioned procedure.    DESCRIPTION OF PROCEDURE:  After informed consent, the patient was taken to the operating room.  He was prepped and draped in sterile fashion, given a dose of preoperative antibiotics.  Under general anesthesia, I inserted the rigid scope in the bladder.  The prostate is small.  I cannot really see much as this is mostly filled with tumor.  However, at the dome of the bladder, I am able to see normal bladder.  I now switched out to a continuous-flow resectoscope and using bipolar energy resected as much of the bladder tumor as possible.  The ureteral orifices were never identified.  There is a least 10 cm or greater of bladder tumor emanating from 1 area.  It is mostly emanating from the right lateral side, but again the entire bladder is covered.  I gave him a lot more capacity, but I am

## 2025-06-01 NOTE — BRIEF OP NOTE
Brief Postoperative Note      Patient: Bonifacio Wolfe  YOB: 1975  MRN: [id number]    Date of Procedure: 6/1/2025    Pre-Op Diagnosis Codes:      * Bladder mass [N32.89]    Post-Op Diagnosis: Same       Procedure(s):  CYSTOSCOPY LARGE  TRANSURETHRAL RESECTION OF BLADDER TUMOR    Surgeon(s):  Amari Guerrero MD    Assistant:  * No surgical staff found *    Anesthesia: General    Estimated Blood Loss (mL): less than 50     Complications: None    Specimens:   ID Type Source Tests Collected by Time Destination   1 :  Tissue Tissue  Amari Guerrero MD 6/1/2025 1251        Implants:  * No implants in log *      Drains:   Urinary Catheter 06/01/25 3 Way (Active)       Findings:  Infection Present At Time Of Surgery (PATOS) (choose all levels that have infection present):  No infection present  Other Findings: Huge bladder tumor involving most of the bladder. UO's could not be identified    Electronically signed by AMARI GUERRERO MD on 6/1/2025 at 12:51 PM

## 2025-06-02 ENCOUNTER — HOSPITAL ENCOUNTER (INPATIENT)
Dept: VASCULAR LAB | Age: 50
Discharge: HOME | End: 2025-06-04
Attending: UROLOGY
Payer: MEDICAID

## 2025-06-02 LAB
ANION GAP SERPL CALCULATED.3IONS-SCNC: 16 MMOL/L (ref 3–16)
BASOPHILS # BLD: 0.1 K/UL (ref 0–0.2)
BASOPHILS NFR BLD: 0.4 %
BUN SERPL-MCNC: 12 MG/DL (ref 7–20)
CALCIUM SERPL-MCNC: 8.7 MG/DL (ref 8.3–10.6)
CHLORIDE SERPL-SCNC: 100 MMOL/L (ref 99–110)
CO2 SERPL-SCNC: 18 MMOL/L (ref 21–32)
CREAT SERPL-MCNC: 1.3 MG/DL (ref 0.9–1.3)
DEPRECATED RDW RBC AUTO: 27.5 % (ref 12.4–15.4)
EOSINOPHIL # BLD: 0 K/UL (ref 0–0.6)
EOSINOPHIL NFR BLD: 0 %
GFR SERPLBLD CREATININE-BSD FMLA CKD-EPI: 67 ML/MIN/{1.73_M2}
GLUCOSE SERPL-MCNC: 124 MG/DL (ref 70–99)
HCT VFR BLD AUTO: 25.1 % (ref 40.5–52.5)
HGB BLD-MCNC: 7.8 G/DL (ref 13.5–17.5)
LYMPHOCYTES # BLD: 0.7 K/UL (ref 1–5.1)
LYMPHOCYTES NFR BLD: 4.1 %
MAGNESIUM SERPL-MCNC: 2.01 MG/DL (ref 1.8–2.4)
MCH RBC QN AUTO: 19.9 PG (ref 26–34)
MCHC RBC AUTO-ENTMCNC: 31.1 G/DL (ref 31–36)
MCV RBC AUTO: 64.2 FL (ref 80–100)
MONOCYTES # BLD: 1.3 K/UL (ref 0–1.3)
MONOCYTES NFR BLD: 7.8 %
NEUTROPHILS # BLD: 15 K/UL (ref 1.7–7.7)
NEUTROPHILS NFR BLD: 87.7 %
PLATELET # BLD AUTO: 425 K/UL (ref 135–450)
PMV BLD AUTO: 8 FL (ref 5–10.5)
POTASSIUM SERPL-SCNC: 3.6 MMOL/L (ref 3.5–5.1)
RBC # BLD AUTO: 3.92 M/UL (ref 4.2–5.9)
SODIUM SERPL-SCNC: 134 MMOL/L (ref 136–145)
WBC # BLD AUTO: 17.2 K/UL (ref 4–11)

## 2025-06-02 PROCEDURE — 6360000002 HC RX W HCPCS: Performed by: UROLOGY

## 2025-06-02 PROCEDURE — 83735 ASSAY OF MAGNESIUM: CPT

## 2025-06-02 PROCEDURE — 93970 EXTREMITY STUDY: CPT

## 2025-06-02 PROCEDURE — 6370000000 HC RX 637 (ALT 250 FOR IP): Performed by: UROLOGY

## 2025-06-02 PROCEDURE — 36415 COLL VENOUS BLD VENIPUNCTURE: CPT

## 2025-06-02 PROCEDURE — 1200000000 HC SEMI PRIVATE

## 2025-06-02 PROCEDURE — 6370000000 HC RX 637 (ALT 250 FOR IP)

## 2025-06-02 PROCEDURE — 80048 BASIC METABOLIC PNL TOTAL CA: CPT

## 2025-06-02 PROCEDURE — 2580000003 HC RX 258: Performed by: UROLOGY

## 2025-06-02 PROCEDURE — 2500000003 HC RX 250 WO HCPCS: Performed by: UROLOGY

## 2025-06-02 PROCEDURE — 85025 COMPLETE CBC W/AUTO DIFF WBC: CPT

## 2025-06-02 PROCEDURE — 51700 IRRIGATION OF BLADDER: CPT

## 2025-06-02 RX ORDER — OXYCODONE HYDROCHLORIDE 5 MG/1
5 TABLET ORAL EVERY 6 HOURS PRN
Refills: 0 | Status: DISCONTINUED | OUTPATIENT
Start: 2025-06-02 | End: 2025-06-06 | Stop reason: HOSPADM

## 2025-06-02 RX ORDER — TRAMADOL HYDROCHLORIDE 50 MG/1
50 TABLET ORAL EVERY 6 HOURS PRN
Status: DISCONTINUED | OUTPATIENT
Start: 2025-06-02 | End: 2025-06-06 | Stop reason: HOSPADM

## 2025-06-02 RX ADMIN — TRAMADOL HYDROCHLORIDE 50 MG: 50 TABLET, COATED ORAL at 08:04

## 2025-06-02 RX ADMIN — OXYCODONE 5 MG: 5 TABLET ORAL at 12:35

## 2025-06-02 RX ADMIN — OXYCODONE 5 MG: 5 TABLET ORAL at 18:35

## 2025-06-02 RX ADMIN — TRAMADOL HYDROCHLORIDE 50 MG: 50 TABLET, COATED ORAL at 02:02

## 2025-06-02 RX ADMIN — SODIUM CHLORIDE, PRESERVATIVE FREE 10 ML: 5 INJECTION INTRAVENOUS at 20:50

## 2025-06-02 RX ADMIN — IRON SUCROSE 300 MG: 20 INJECTION, SOLUTION INTRAVENOUS at 12:16

## 2025-06-02 RX ADMIN — TRAMADOL HYDROCHLORIDE 50 MG: 50 TABLET, COATED ORAL at 15:28

## 2025-06-02 RX ADMIN — SODIUM CHLORIDE, PRESERVATIVE FREE 10 ML: 5 INJECTION INTRAVENOUS at 07:57

## 2025-06-02 RX ADMIN — TRAMADOL HYDROCHLORIDE 50 MG: 50 TABLET, COATED ORAL at 22:49

## 2025-06-02 ASSESSMENT — PAIN - FUNCTIONAL ASSESSMENT

## 2025-06-02 ASSESSMENT — PAIN DESCRIPTION - ONSET
ONSET: ON-GOING
ONSET: GRADUAL
ONSET: ON-GOING

## 2025-06-02 ASSESSMENT — PAIN SCALES - GENERAL
PAINLEVEL_OUTOF10: 4
PAINLEVEL_OUTOF10: 7
PAINLEVEL_OUTOF10: 8
PAINLEVEL_OUTOF10: 4
PAINLEVEL_OUTOF10: 7
PAINLEVEL_OUTOF10: 5
PAINLEVEL_OUTOF10: 7
PAINLEVEL_OUTOF10: 8
PAINLEVEL_OUTOF10: 7
PAINLEVEL_OUTOF10: 7
PAINLEVEL_OUTOF10: 8
PAINLEVEL_OUTOF10: 0
PAINLEVEL_OUTOF10: 4
PAINLEVEL_OUTOF10: 6
PAINLEVEL_OUTOF10: 7
PAINLEVEL_OUTOF10: 10
PAINLEVEL_OUTOF10: 0
PAINLEVEL_OUTOF10: 7

## 2025-06-02 ASSESSMENT — PAIN DESCRIPTION - PAIN TYPE
TYPE: ACUTE PAIN;SURGICAL PAIN
TYPE: ACUTE PAIN
TYPE: ACUTE PAIN
TYPE: ACUTE PAIN;SURGICAL PAIN

## 2025-06-02 ASSESSMENT — PAIN DESCRIPTION - DESCRIPTORS
DESCRIPTORS: ACHING
DESCRIPTORS: PRESSURE;DISCOMFORT
DESCRIPTORS: ACHING
DESCRIPTORS: ACHING
DESCRIPTORS: PRESSURE
DESCRIPTORS: ACHING
DESCRIPTORS: PRESSURE

## 2025-06-02 ASSESSMENT — PAIN DESCRIPTION - FREQUENCY
FREQUENCY: CONTINUOUS
FREQUENCY: CONTINUOUS
FREQUENCY: INTERMITTENT
FREQUENCY: CONTINUOUS

## 2025-06-02 ASSESSMENT — PAIN DESCRIPTION - LOCATION
LOCATION: BACK
LOCATION: PENIS
LOCATION: PENIS
LOCATION: BACK
LOCATION: PENIS
LOCATION: BACK
LOCATION: BACK

## 2025-06-02 ASSESSMENT — PAIN SCALES - WONG BAKER
WONGBAKER_NUMERICALRESPONSE: NO HURT
WONGBAKER_NUMERICALRESPONSE: HURTS WHOLE LOT
WONGBAKER_NUMERICALRESPONSE: NO HURT

## 2025-06-02 ASSESSMENT — PAIN DESCRIPTION - ORIENTATION
ORIENTATION: MID
ORIENTATION: LOWER
ORIENTATION: LOWER
ORIENTATION: MID
ORIENTATION: MID;LOWER

## 2025-06-03 LAB
ANION GAP SERPL CALCULATED.3IONS-SCNC: 10 MMOL/L (ref 3–16)
BASOPHILS # BLD: 0.1 K/UL (ref 0–0.2)
BASOPHILS NFR BLD: 0.4 %
BUN SERPL-MCNC: 11 MG/DL (ref 7–20)
BURR CELLS BLD QL SMEAR: ABNORMAL
CALCIUM SERPL-MCNC: 8.5 MG/DL (ref 8.3–10.6)
CHLORIDE SERPL-SCNC: 100 MMOL/L (ref 99–110)
CO2 SERPL-SCNC: 23 MMOL/L (ref 21–32)
CREAT SERPL-MCNC: 1.2 MG/DL (ref 0.9–1.3)
DEPRECATED RDW RBC AUTO: 28.4 % (ref 12.4–15.4)
EOSINOPHIL # BLD: 0 K/UL (ref 0–0.6)
EOSINOPHIL NFR BLD: 0.2 %
GFR SERPLBLD CREATININE-BSD FMLA CKD-EPI: 74 ML/MIN/{1.73_M2}
GLUCOSE SERPL-MCNC: 92 MG/DL (ref 70–99)
HCT VFR BLD AUTO: 24.3 % (ref 40.5–52.5)
HGB BLD-MCNC: 7.7 G/DL (ref 13.5–17.5)
HYPOCHROMIA BLD QL SMEAR: ABNORMAL
LYMPHOCYTES # BLD: 0.9 K/UL (ref 1–5.1)
LYMPHOCYTES NFR BLD: 6.6 %
MAGNESIUM SERPL-MCNC: 1.78 MG/DL (ref 1.8–2.4)
MCH RBC QN AUTO: 19.9 PG (ref 26–34)
MCHC RBC AUTO-ENTMCNC: 31.6 G/DL (ref 31–36)
MCV RBC AUTO: 63 FL (ref 80–100)
MICROCYTES BLD QL SMEAR: ABNORMAL
MONOCYTES # BLD: 1 K/UL (ref 0–1.3)
MONOCYTES NFR BLD: 7.5 %
NEUTROPHILS # BLD: 11.8 K/UL (ref 1.7–7.7)
NEUTROPHILS NFR BLD: 85.3 %
OVALOCYTES BLD QL SMEAR: ABNORMAL
PLATELET # BLD AUTO: 395 K/UL (ref 135–450)
PMV BLD AUTO: 8.4 FL (ref 5–10.5)
POTASSIUM SERPL-SCNC: 3.4 MMOL/L (ref 3.5–5.1)
RBC # BLD AUTO: 3.87 M/UL (ref 4.2–5.9)
RBC MORPH BLD: NORMAL
SODIUM SERPL-SCNC: 133 MMOL/L (ref 136–145)
SPHEROCYTES BLD QL SMEAR: ABNORMAL
WBC # BLD AUTO: 13.8 K/UL (ref 4–11)

## 2025-06-03 PROCEDURE — 97530 THERAPEUTIC ACTIVITIES: CPT

## 2025-06-03 PROCEDURE — 6370000000 HC RX 637 (ALT 250 FOR IP)

## 2025-06-03 PROCEDURE — 93970 EXTREMITY STUDY: CPT | Performed by: SURGERY

## 2025-06-03 PROCEDURE — 1200000000 HC SEMI PRIVATE

## 2025-06-03 PROCEDURE — 97535 SELF CARE MNGMENT TRAINING: CPT

## 2025-06-03 PROCEDURE — 2500000003 HC RX 250 WO HCPCS: Performed by: UROLOGY

## 2025-06-03 PROCEDURE — 97162 PT EVAL MOD COMPLEX 30 MIN: CPT

## 2025-06-03 PROCEDURE — 6370000000 HC RX 637 (ALT 250 FOR IP): Performed by: UROLOGY

## 2025-06-03 PROCEDURE — 97116 GAIT TRAINING THERAPY: CPT

## 2025-06-03 PROCEDURE — 97166 OT EVAL MOD COMPLEX 45 MIN: CPT

## 2025-06-03 PROCEDURE — 2580000003 HC RX 258: Performed by: UROLOGY

## 2025-06-03 PROCEDURE — 80048 BASIC METABOLIC PNL TOTAL CA: CPT

## 2025-06-03 PROCEDURE — 6360000002 HC RX W HCPCS: Performed by: UROLOGY

## 2025-06-03 PROCEDURE — 36415 COLL VENOUS BLD VENIPUNCTURE: CPT

## 2025-06-03 PROCEDURE — 85025 COMPLETE CBC W/AUTO DIFF WBC: CPT

## 2025-06-03 PROCEDURE — 83735 ASSAY OF MAGNESIUM: CPT

## 2025-06-03 RX ADMIN — IRON SUCROSE 300 MG: 20 INJECTION, SOLUTION INTRAVENOUS at 14:16

## 2025-06-03 RX ADMIN — SODIUM CHLORIDE, PRESERVATIVE FREE 10 ML: 5 INJECTION INTRAVENOUS at 07:42

## 2025-06-03 RX ADMIN — POTASSIUM CHLORIDE 40 MEQ: 1500 TABLET, EXTENDED RELEASE ORAL at 07:42

## 2025-06-03 RX ADMIN — OXYCODONE 5 MG: 5 TABLET ORAL at 19:16

## 2025-06-03 RX ADMIN — TRAMADOL HYDROCHLORIDE 50 MG: 50 TABLET, COATED ORAL at 11:02

## 2025-06-03 RX ADMIN — OXYCODONE 5 MG: 5 TABLET ORAL at 00:47

## 2025-06-03 RX ADMIN — OXYCODONE 5 MG: 5 TABLET ORAL at 07:16

## 2025-06-03 RX ADMIN — TRAMADOL HYDROCHLORIDE 50 MG: 50 TABLET, COATED ORAL at 05:02

## 2025-06-03 RX ADMIN — TRAMADOL HYDROCHLORIDE 50 MG: 50 TABLET, COATED ORAL at 23:16

## 2025-06-03 RX ADMIN — METHOCARBAMOL 750 MG: 750 TABLET ORAL at 15:25

## 2025-06-03 RX ADMIN — OXYCODONE 5 MG: 5 TABLET ORAL at 13:16

## 2025-06-03 RX ADMIN — TRAMADOL HYDROCHLORIDE 50 MG: 50 TABLET, COATED ORAL at 17:13

## 2025-06-03 RX ADMIN — DICYCLOMINE HYDROCHLORIDE 20 MG: 10 CAPSULE ORAL at 17:14

## 2025-06-03 RX ADMIN — SODIUM CHLORIDE, PRESERVATIVE FREE 10 ML: 5 INJECTION INTRAVENOUS at 20:04

## 2025-06-03 ASSESSMENT — PAIN SCALES - WONG BAKER
WONGBAKER_NUMERICALRESPONSE: NO HURT

## 2025-06-03 ASSESSMENT — PAIN DESCRIPTION - FREQUENCY
FREQUENCY: CONTINUOUS

## 2025-06-03 ASSESSMENT — PAIN DESCRIPTION - ONSET
ONSET: ON-GOING

## 2025-06-03 ASSESSMENT — PAIN SCALES - GENERAL
PAINLEVEL_OUTOF10: 6
PAINLEVEL_OUTOF10: 8
PAINLEVEL_OUTOF10: 7
PAINLEVEL_OUTOF10: 8
PAINLEVEL_OUTOF10: 8
PAINLEVEL_OUTOF10: 6
PAINLEVEL_OUTOF10: 8
PAINLEVEL_OUTOF10: 6
PAINLEVEL_OUTOF10: 0
PAINLEVEL_OUTOF10: 7
PAINLEVEL_OUTOF10: 0
PAINLEVEL_OUTOF10: 8
PAINLEVEL_OUTOF10: 7
PAINLEVEL_OUTOF10: 8

## 2025-06-03 ASSESSMENT — PAIN DESCRIPTION - DESCRIPTORS
DESCRIPTORS: ACHING
DESCRIPTORS: ACHING;SPASM

## 2025-06-03 ASSESSMENT — PAIN - FUNCTIONAL ASSESSMENT
PAIN_FUNCTIONAL_ASSESSMENT: ACTIVITIES ARE NOT PREVENTED
PAIN_FUNCTIONAL_ASSESSMENT: PREVENTS OR INTERFERES SOME ACTIVE ACTIVITIES AND ADLS

## 2025-06-03 ASSESSMENT — PAIN DESCRIPTION - LOCATION
LOCATION: BACK

## 2025-06-03 ASSESSMENT — PAIN DESCRIPTION - ORIENTATION
ORIENTATION: LOWER

## 2025-06-03 ASSESSMENT — PAIN DESCRIPTION - PAIN TYPE
TYPE: ACUTE PAIN;SURGICAL PAIN

## 2025-06-03 NOTE — CARE COORDINATION
15:45  [facility] d/t pt's recent drug testing + for fentanyl.  Several more referrals sent to possible facilities for LTC through [facility].       11:15  Call made to Denise @ [facility] - spoke with pt and he is agreeable to LTC if he is able to \"come and go during the day at facility\" - Denise looking over his chart.

## 2025-06-03 NOTE — CARE COORDINATION
Case Management Assessment  Initial Evaluation    Date/Time of Evaluation: 6/3/2025 9:59 AM  Assessment Completed by: Faye Carbajal    If patient is discharged prior to next notation, then this note serves as note for discharge by case management.    Patient Name: Bonifacio Wolfe                   YOB: 1975  Diagnosis: Accidental drug overdose, initial encounter [T50.711A]  Drug overdose, accidental or unintentional, initial encounter [T50.901A]  Anemia, unspecified type [D64.9]                   Date / Time: 5/30/2025  8:42 AM    Patient Admission Status: Inpatient   Readmission Risk (Low < 19, Mod (19-27), High > 27): Readmission Risk Score: 9.9    Current PCP: No primary care provider on file.  PCP verified by CM? No    Chart Reviewed: Yes      History Provided by: Patient, Medical Record  Patient Orientation: Alert and Oriented    Patient Cognition: Alert    Hospitalization in the last 30 days (Readmission):  No    If yes, Readmission Assessment in CM Navigator will be completed.    Advance Directives:      Code Status: Full Code   Patient's Primary Decision Maker is: Patient Declined (Legal Next of Kin Remains as Decision Maker)      Discharge Planning:    Patient lives with: Alone Type of Home: Homeless  Primary Care Giver: Self  Patient Support Systems include: None   Current Financial resources: Medicaid  Current community resources: None  Current services prior to admission: None            Current DME:              Type of Home Care services:  None    ADLS  Prior functional level: Independent in ADLs/IADLs  Current functional level: Independent in ADLs/IADLs    PT AM-PAC:   /24  OT AM-PAC:   /24    Family can provide assistance at DC: No  Would you like Case Management to discuss the discharge plan with any other family members/significant others, and if so, who? No  Plans to Return to Present Housing: No  Other Identified Issues/Barriers to RETURNING to current housing: yes -

## 2025-06-04 ENCOUNTER — APPOINTMENT (OUTPATIENT)
Dept: CT IMAGING | Age: 50
End: 2025-06-04
Payer: MEDICAID

## 2025-06-04 LAB
ANION GAP SERPL CALCULATED.3IONS-SCNC: 11 MMOL/L (ref 3–16)
BASOPHILS # BLD: 0.1 K/UL (ref 0–0.2)
BASOPHILS NFR BLD: 0.4 %
BUN SERPL-MCNC: 13 MG/DL (ref 7–20)
BURR CELLS BLD QL SMEAR: ABNORMAL
CALCIUM SERPL-MCNC: 8.6 MG/DL (ref 8.3–10.6)
CHLORIDE SERPL-SCNC: 101 MMOL/L (ref 99–110)
CO2 SERPL-SCNC: 23 MMOL/L (ref 21–32)
CREAT SERPL-MCNC: 1.2 MG/DL (ref 0.9–1.3)
DEPRECATED RDW RBC AUTO: 27.9 % (ref 12.4–15.4)
EOSINOPHIL # BLD: 0 K/UL (ref 0–0.6)
EOSINOPHIL NFR BLD: 0.3 %
GFR SERPLBLD CREATININE-BSD FMLA CKD-EPI: 74 ML/MIN/{1.73_M2}
GLUCOSE SERPL-MCNC: 99 MG/DL (ref 70–99)
HCT VFR BLD AUTO: 24.1 % (ref 40.5–52.5)
HGB BLD-MCNC: 7.7 G/DL (ref 13.5–17.5)
HYPOCHROMIA BLD QL SMEAR: ABNORMAL
LYMPHOCYTES # BLD: 1.1 K/UL (ref 1–5.1)
LYMPHOCYTES NFR BLD: 8.7 %
MAGNESIUM SERPL-MCNC: 1.7 MG/DL (ref 1.8–2.4)
MCH RBC QN AUTO: 20.5 PG (ref 26–34)
MCHC RBC AUTO-ENTMCNC: 31.8 G/DL (ref 31–36)
MCV RBC AUTO: 64.3 FL (ref 80–100)
MICROCYTES BLD QL SMEAR: ABNORMAL
MONOCYTES # BLD: 1.1 K/UL (ref 0–1.3)
MONOCYTES NFR BLD: 8.8 %
NEUTROPHILS # BLD: 10.1 K/UL (ref 1.7–7.7)
NEUTROPHILS NFR BLD: 81.8 %
PLATELET # BLD AUTO: 378 K/UL (ref 135–450)
PMV BLD AUTO: 8.4 FL (ref 5–10.5)
POTASSIUM SERPL-SCNC: 3.4 MMOL/L (ref 3.5–5.1)
RBC # BLD AUTO: 3.75 M/UL (ref 4.2–5.9)
RBC MORPH BLD: NORMAL
SODIUM SERPL-SCNC: 135 MMOL/L (ref 136–145)
WBC # BLD AUTO: 12.3 K/UL (ref 4–11)

## 2025-06-04 PROCEDURE — 6360000002 HC RX W HCPCS: Performed by: SURGERY

## 2025-06-04 PROCEDURE — 6370000000 HC RX 637 (ALT 250 FOR IP)

## 2025-06-04 PROCEDURE — 85025 COMPLETE CBC W/AUTO DIFF WBC: CPT

## 2025-06-04 PROCEDURE — 80048 BASIC METABOLIC PNL TOTAL CA: CPT

## 2025-06-04 PROCEDURE — 99221 1ST HOSP IP/OBS SF/LOW 40: CPT | Performed by: NURSE PRACTITIONER

## 2025-06-04 PROCEDURE — 97116 GAIT TRAINING THERAPY: CPT

## 2025-06-04 PROCEDURE — 51798 US URINE CAPACITY MEASURE: CPT

## 2025-06-04 PROCEDURE — 1200000000 HC SEMI PRIVATE

## 2025-06-04 PROCEDURE — 71260 CT THORAX DX C+: CPT

## 2025-06-04 PROCEDURE — 6370000000 HC RX 637 (ALT 250 FOR IP): Performed by: NURSE PRACTITIONER

## 2025-06-04 PROCEDURE — 2500000003 HC RX 250 WO HCPCS: Performed by: UROLOGY

## 2025-06-04 PROCEDURE — 36415 COLL VENOUS BLD VENIPUNCTURE: CPT

## 2025-06-04 PROCEDURE — 97110 THERAPEUTIC EXERCISES: CPT

## 2025-06-04 PROCEDURE — 6360000004 HC RX CONTRAST MEDICATION: Performed by: NURSE PRACTITIONER

## 2025-06-04 PROCEDURE — 83735 ASSAY OF MAGNESIUM: CPT

## 2025-06-04 RX ORDER — NICOTINE 21 MG/24HR
1 PATCH, TRANSDERMAL 24 HOURS TRANSDERMAL DAILY
Status: DISCONTINUED | OUTPATIENT
Start: 2025-06-04 | End: 2025-06-06 | Stop reason: HOSPADM

## 2025-06-04 RX ORDER — IOPAMIDOL 755 MG/ML
75 INJECTION, SOLUTION INTRAVASCULAR
Status: COMPLETED | OUTPATIENT
Start: 2025-06-04 | End: 2025-06-04

## 2025-06-04 RX ORDER — MAGNESIUM SULFATE IN WATER 40 MG/ML
2000 INJECTION, SOLUTION INTRAVENOUS ONCE
Status: COMPLETED | OUTPATIENT
Start: 2025-06-04 | End: 2025-06-04

## 2025-06-04 RX ADMIN — OXYCODONE 5 MG: 5 TABLET ORAL at 21:38

## 2025-06-04 RX ADMIN — SODIUM CHLORIDE, PRESERVATIVE FREE 10 ML: 5 INJECTION INTRAVENOUS at 08:45

## 2025-06-04 RX ADMIN — IOPAMIDOL 75 ML: 755 INJECTION, SOLUTION INTRAVENOUS at 12:55

## 2025-06-04 RX ADMIN — TRAMADOL HYDROCHLORIDE 50 MG: 50 TABLET, COATED ORAL at 11:39

## 2025-06-04 RX ADMIN — METHOCARBAMOL 750 MG: 750 TABLET ORAL at 19:54

## 2025-06-04 RX ADMIN — OXYCODONE 5 MG: 5 TABLET ORAL at 08:42

## 2025-06-04 RX ADMIN — OXYCODONE 5 MG: 5 TABLET ORAL at 01:41

## 2025-06-04 RX ADMIN — MAGNESIUM SULFATE HEPTAHYDRATE 2000 MG: 40 INJECTION, SOLUTION INTRAVENOUS at 10:08

## 2025-06-04 RX ADMIN — OXYCODONE 5 MG: 5 TABLET ORAL at 14:55

## 2025-06-04 RX ADMIN — SODIUM CHLORIDE, PRESERVATIVE FREE 10 ML: 5 INJECTION INTRAVENOUS at 19:51

## 2025-06-04 RX ADMIN — METHOCARBAMOL 750 MG: 750 TABLET ORAL at 06:17

## 2025-06-04 RX ADMIN — TRAMADOL HYDROCHLORIDE 50 MG: 50 TABLET, COATED ORAL at 19:02

## 2025-06-04 ASSESSMENT — PAIN SCALES - GENERAL
PAINLEVEL_OUTOF10: 8
PAINLEVEL_OUTOF10: 8
PAINLEVEL_OUTOF10: 7
PAINLEVEL_OUTOF10: 8
PAINLEVEL_OUTOF10: 7
PAINLEVEL_OUTOF10: 7
PAINLEVEL_OUTOF10: 8
PAINLEVEL_OUTOF10: 0
PAINLEVEL_OUTOF10: 0
PAINLEVEL_OUTOF10: 8
PAINLEVEL_OUTOF10: 0
PAINLEVEL_OUTOF10: 7
PAINLEVEL_OUTOF10: 8
PAINLEVEL_OUTOF10: 8
PAINLEVEL_OUTOF10: 0
PAINLEVEL_OUTOF10: 8

## 2025-06-04 ASSESSMENT — PAIN DESCRIPTION - ORIENTATION
ORIENTATION: MID
ORIENTATION: MID
ORIENTATION: LOWER
ORIENTATION: MID
ORIENTATION: MID
ORIENTATION: LOWER
ORIENTATION: MID
ORIENTATION: LOWER
ORIENTATION: MID
ORIENTATION: LOWER

## 2025-06-04 ASSESSMENT — PAIN - FUNCTIONAL ASSESSMENT

## 2025-06-04 ASSESSMENT — PAIN DESCRIPTION - FREQUENCY
FREQUENCY: CONTINUOUS
FREQUENCY: INTERMITTENT
FREQUENCY: CONTINUOUS
FREQUENCY: CONTINUOUS

## 2025-06-04 ASSESSMENT — PAIN DESCRIPTION - ONSET
ONSET: ON-GOING

## 2025-06-04 ASSESSMENT — PAIN DESCRIPTION - LOCATION
LOCATION: BACK

## 2025-06-04 ASSESSMENT — PAIN DESCRIPTION - DESCRIPTORS
DESCRIPTORS: BURNING
DESCRIPTORS: ACHING;BURNING
DESCRIPTORS: BURNING
DESCRIPTORS: BURNING
DESCRIPTORS: ACHING
DESCRIPTORS: BURNING

## 2025-06-04 ASSESSMENT — PAIN DESCRIPTION - PAIN TYPE
TYPE: ACUTE PAIN;SURGICAL PAIN
TYPE: CHRONIC PAIN
TYPE: ACUTE PAIN;SURGICAL PAIN
TYPE: CHRONIC PAIN

## 2025-06-04 ASSESSMENT — ENCOUNTER SYMPTOMS
BACK PAIN: 1
GASTROINTESTINAL NEGATIVE: 1
RESPIRATORY NEGATIVE: 1

## 2025-06-04 ASSESSMENT — PAIN SCALES - WONG BAKER
WONGBAKER_NUMERICALRESPONSE: NO HURT
WONGBAKER_NUMERICALRESPONSE: NO HURT

## 2025-06-04 NOTE — CONSULTS
[facility]    Consult Note      Requesting Physician:  Christian Mccormick     CHIEF COMPLAINT:  Drug overdose       HISTORY OF PRESENT ILLNESS:    Mr. Wolfe  is a 49 y.o. male we are seeing in consultation for Bladder Cancer. He has a history of neglected bladder mass. Complicated social history as he is homeless and active drug user.     Most recently he was seen by Urology at  and was scheduled for TURBT. He had multiple prior TURBT cancellations. This appears to have been scheduled for 5/27/25.     Instead, he presented to the ED via EMS on 5/30/25 after being found unresponsive in [facility] bathroom. He received Narcan. He reported taking a pill from someone that he thought was Tylenol. UDS positive for benzodiazepines, cocaine, opiates, and fentanyl.     CT AP with Large bladder mass contiguous with the prostate gland with associated lymphadenopathy, mass effect upon the ureters with bilateral hydronephrosis and mass effect upon the right external iliac and common iliac veins which could indicate external  compression or thrombus with asymmetric edema of the right thigh.    He underwent TURBT procedure on 5/30/25. Path reveals Invasive high-grade urothelial carcinoma with muscularis propria invasion.     He tells me that his goals are to pursue treatment for his cancer. He has poor insight in to his condition. He denies drug use when asked and acts surprised when I tell him that his UDS was positive for several substances. He has a history of multiple drug overdoses in the past 5 years. He is currently homeless and staying in a shelter in [place]. States he has friends but no one he can live with and no one who can help him.         Past Medical History:  Past Medical History:   Diagnosis Date    Affective disorder     Diabetes mellitus (HCC)     Hepatitis C     Homeless     Knee pain  
[facility]  Palliative Medicine Consultation Note      Date Of Admission:2025  Date of consult: 25  Seen by PC in the past:  No    Recommendations:        Discussed case with Jeanette STORM and MITCHELL Krause. I met with the pt. Introduced palliative care and had a voluntary discussion about advance care planning. Discussed his newly diagnosed high-grade urothelial cancer and CT this afternoon showing numerous pulmonary nodules and lymphadenopathy concerning for metastatic disease. He expressed a desire for chemotherapy and cystectomy. I let him know that there are multiple significant barriers to treatment including homelessness, lack of social support, and substance abuse. He said he would be willing to go to a nursing home and agree to not leave the premises and not use any illicit substances, and I let him know that we could attempt to find an accepting facility but that it might be very difficult, especially if he needs a port. I let him know that f/u at  for treatment may be his best option.     He reports that he lives in a shelter in [place], which he built himself. It is not an established homeless shelter with resources available. He says he has no family or friends. Upon further questioning, he says his parents are  and that he has one half sister who he is not in contact with, and two adult children who he has not talked with in about 6 years. I encouraged him to provide an emergency contact, to complete a HCPOA, and to notify his family of his diagnosis. I let him know that if he didn't name anyone, and he became incapacitated, that we would attempt to contact his adult children to make decisions on his behalf. He said he'd consider contacting them. During my visit, he asked if he could go outside and smoke and also ended the visit when I was attempting to discuss code status with him, stating he needed to urinate.    1. Goals of Care/Advanced Care planning/Code 
Value Date/Time     05/31/2025 03:47 AM    K 3.6 05/31/2025 03:47 AM     05/31/2025 03:47 AM    CO2 27 05/31/2025 03:47 AM    BUN 20 05/31/2025 03:47 AM    CREATININE 1.2 05/31/2025 03:47 AM    CALCIUM 8.9 05/31/2025 03:47 AM    GFRAA >60 11/13/2016 08:52 AM    LABGLOM 74 05/31/2025 03:47 AM    LABGLOM >90 04/28/2024 09:42 PM     PT/INR:    Lab Results   Component Value Date/Time    PROTIME 13.9 09/25/2024 07:41 PM    INR 1.04 09/25/2024 07:41 PM     PTT:    Lab Results   Component Value Date/Time    APTT 25.8 09/25/2024 07:41 PM   [APTT      Imaging: CT scan  IMPRESSION:        1. Large bladder mass contiguous with the prostate gland with associated lymphadenopathy, mass effect upon the ureters with bilateral hydronephrosis and mass effect upon the right external iliac and common iliac veins which could indicate external   compression or thrombus with asymmetric edema of the right thigh. Findings may represent prostate or bladder neoplasm and should be correlated with biopsy.    Impression/Plan: Unfortunate 49-year-old gentleman with likely metastatic or at least locally advanced bladder cancer.  He continues to have gross hematuria and his hemoglobin is gone from 4.7-7.2 after a few units of blood.  - He has missed multiple appointments for a TURBT at [facility].  - I have recommended that while he is an inpatient he undergo TURBT to at least decrease or stop the bleeding and to get a pathological diagnosis.  - In his case, standard of care would be neoadjuvant chemotherapy followed by radical cystectomy and ileal conduit.  - Unfortunately, with his social situation including being homeless this is extremely unlikely to happen.  - I would recommend palliative care consultation.  - I would recommend TURBT tomorrow and will make n.p.o. after midnight.  I think the chances are close to 0 that he would show up for an outpatient TURBT as he is already missed multiple chances with [facility].    AMARI 
--  3.6  --         Assessment:    Hospital Problems           Last Modified POA    * (Principal) Drug overdose, accidental or unintentional, initial encounter 5/30/2025 Yes       Anemia    Plan:  49-year-old male who was found down due to likely drug overdose and was found to have significant anemia.  Further imaging showed a large bladder wall mass and the patient has apparently had hematuria for the past year.  No clinical signs of any GI bleeding.  This is most likely related to the hematuria and our service will sign off.  Please call with questions.      Alison Tang MD    [facility]    [phone]. Also available via [employer] Serve    Please note that some or all of this record was generated using voice recognition software. If there are any questions about the content of this document, please contact the author as some errors in translation may have occurred.

## 2025-06-05 LAB
ANION GAP SERPL CALCULATED.3IONS-SCNC: 12 MMOL/L (ref 3–16)
ANISOCYTOSIS BLD QL SMEAR: ABNORMAL
BASOPHILS # BLD: 0 K/UL (ref 0–0.2)
BASOPHILS NFR BLD: 0.4 %
BUN SERPL-MCNC: 12 MG/DL (ref 7–20)
BURR CELLS BLD QL SMEAR: ABNORMAL
CALCIUM SERPL-MCNC: 8.7 MG/DL (ref 8.3–10.6)
CHLORIDE SERPL-SCNC: 99 MMOL/L (ref 99–110)
CO2 SERPL-SCNC: 23 MMOL/L (ref 21–32)
CREAT SERPL-MCNC: 1.2 MG/DL (ref 0.9–1.3)
DEPRECATED RDW RBC AUTO: 28.9 % (ref 12.4–15.4)
EOSINOPHIL # BLD: 0.1 K/UL (ref 0–0.6)
EOSINOPHIL NFR BLD: 1.2 %
GFR SERPLBLD CREATININE-BSD FMLA CKD-EPI: 74 ML/MIN/{1.73_M2}
GLUCOSE SERPL-MCNC: 103 MG/DL (ref 70–99)
HCT VFR BLD AUTO: 25.2 % (ref 40.5–52.5)
HGB BLD-MCNC: 8 G/DL (ref 13.5–17.5)
HYPOCHROMIA BLD QL SMEAR: ABNORMAL
LYMPHOCYTES # BLD: 1.3 K/UL (ref 1–5.1)
LYMPHOCYTES NFR BLD: 11.9 %
MACROCYTES BLD QL SMEAR: ABNORMAL
MCH RBC QN AUTO: 20.1 PG (ref 26–34)
MCHC RBC AUTO-ENTMCNC: 31.6 G/DL (ref 31–36)
MCV RBC AUTO: 63.8 FL (ref 80–100)
MICROCYTES BLD QL SMEAR: ABNORMAL
MONOCYTES # BLD: 0.9 K/UL (ref 0–1.3)
MONOCYTES NFR BLD: 7.8 %
NEUTROPHILS # BLD: 8.9 K/UL (ref 1.7–7.7)
NEUTROPHILS NFR BLD: 78.7 %
PATH INTERP BLD-IMP: NO
PLATELET # BLD AUTO: 389 K/UL (ref 135–450)
PLATELET BLD QL SMEAR: ADEQUATE
PMV BLD AUTO: 8.4 FL (ref 5–10.5)
POTASSIUM SERPL-SCNC: 3.7 MMOL/L (ref 3.5–5.1)
RBC # BLD AUTO: 3.95 M/UL (ref 4.2–5.9)
SCHISTOCYTES BLD QL SMEAR: ABNORMAL
SLIDE REVIEW: ABNORMAL
SODIUM SERPL-SCNC: 134 MMOL/L (ref 136–145)
WBC # BLD AUTO: 11.3 K/UL (ref 4–11)

## 2025-06-05 PROCEDURE — 6370000000 HC RX 637 (ALT 250 FOR IP)

## 2025-06-05 PROCEDURE — 85025 COMPLETE CBC W/AUTO DIFF WBC: CPT

## 2025-06-05 PROCEDURE — 36415 COLL VENOUS BLD VENIPUNCTURE: CPT

## 2025-06-05 PROCEDURE — 1200000000 HC SEMI PRIVATE

## 2025-06-05 PROCEDURE — 80048 BASIC METABOLIC PNL TOTAL CA: CPT

## 2025-06-05 PROCEDURE — 6370000000 HC RX 637 (ALT 250 FOR IP): Performed by: UROLOGY

## 2025-06-05 PROCEDURE — 2500000003 HC RX 250 WO HCPCS: Performed by: UROLOGY

## 2025-06-05 RX ADMIN — SODIUM CHLORIDE, PRESERVATIVE FREE 10 ML: 5 INJECTION INTRAVENOUS at 08:15

## 2025-06-05 RX ADMIN — TRAMADOL HYDROCHLORIDE 50 MG: 50 TABLET, COATED ORAL at 10:57

## 2025-06-05 RX ADMIN — TRAZODONE HYDROCHLORIDE 50 MG: 50 TABLET ORAL at 02:37

## 2025-06-05 RX ADMIN — TRAMADOL HYDROCHLORIDE 50 MG: 50 TABLET, COATED ORAL at 22:58

## 2025-06-05 RX ADMIN — SODIUM CHLORIDE, PRESERVATIVE FREE 10 ML: 5 INJECTION INTRAVENOUS at 20:03

## 2025-06-05 RX ADMIN — OXYCODONE 5 MG: 5 TABLET ORAL at 07:47

## 2025-06-05 RX ADMIN — OXYCODONE 5 MG: 5 TABLET ORAL at 14:04

## 2025-06-05 RX ADMIN — TRAMADOL HYDROCHLORIDE 50 MG: 50 TABLET, COATED ORAL at 02:37

## 2025-06-05 RX ADMIN — METHOCARBAMOL 750 MG: 750 TABLET ORAL at 16:57

## 2025-06-05 RX ADMIN — TRAMADOL HYDROCHLORIDE 50 MG: 50 TABLET, COATED ORAL at 16:57

## 2025-06-05 RX ADMIN — OXYCODONE 5 MG: 5 TABLET ORAL at 20:03

## 2025-06-05 ASSESSMENT — PAIN DESCRIPTION - ONSET
ONSET: ON-GOING

## 2025-06-05 ASSESSMENT — PAIN DESCRIPTION - LOCATION
LOCATION: BACK

## 2025-06-05 ASSESSMENT — PAIN DESCRIPTION - DESCRIPTORS
DESCRIPTORS: BURNING
DESCRIPTORS: ACHING;BURNING
DESCRIPTORS: ACHING;BURNING

## 2025-06-05 ASSESSMENT — PAIN SCALES - GENERAL
PAINLEVEL_OUTOF10: 6
PAINLEVEL_OUTOF10: 8
PAINLEVEL_OUTOF10: 5
PAINLEVEL_OUTOF10: 8
PAINLEVEL_OUTOF10: 5
PAINLEVEL_OUTOF10: 8
PAINLEVEL_OUTOF10: 7

## 2025-06-05 ASSESSMENT — PAIN DESCRIPTION - DIRECTION: RADIATING_TOWARDS: N/

## 2025-06-05 ASSESSMENT — PAIN - FUNCTIONAL ASSESSMENT
PAIN_FUNCTIONAL_ASSESSMENT: ACTIVITIES ARE NOT PREVENTED
PAIN_FUNCTIONAL_ASSESSMENT: PREVENTS OR INTERFERES SOME ACTIVE ACTIVITIES AND ADLS
PAIN_FUNCTIONAL_ASSESSMENT: PREVENTS OR INTERFERES SOME ACTIVE ACTIVITIES AND ADLS
PAIN_FUNCTIONAL_ASSESSMENT: ACTIVITIES ARE NOT PREVENTED

## 2025-06-05 ASSESSMENT — PAIN DESCRIPTION - ORIENTATION
ORIENTATION: MID
ORIENTATION: LOWER
ORIENTATION: RIGHT;LEFT;MID
ORIENTATION: LOWER
ORIENTATION: MID
ORIENTATION: LOWER
ORIENTATION: RIGHT;LEFT;MID
ORIENTATION: MID

## 2025-06-05 ASSESSMENT — PAIN DESCRIPTION - FREQUENCY
FREQUENCY: CONTINUOUS

## 2025-06-05 ASSESSMENT — PAIN DESCRIPTION - PAIN TYPE
TYPE: CHRONIC PAIN

## 2025-06-05 NOTE — CARE COORDINATION
Per conversation with Dr. Mccormick, looking a the medical possibility of moving pt to UC for oncologic and social needs.  Will continue to monitor and follow.

## 2025-06-06 VITALS
HEIGHT: 71 IN | TEMPERATURE: 97.5 F | DIASTOLIC BLOOD PRESSURE: 88 MMHG | WEIGHT: 151.4 LBS | OXYGEN SATURATION: 95 % | HEART RATE: 64 BPM | RESPIRATION RATE: 16 BRPM | BODY MASS INDEX: 21.19 KG/M2 | SYSTOLIC BLOOD PRESSURE: 170 MMHG

## 2025-06-06 LAB
ANION GAP SERPL CALCULATED.3IONS-SCNC: 11 MMOL/L (ref 3–16)
BASOPHILS # BLD: 0 K/UL (ref 0–0.2)
BASOPHILS NFR BLD: 0.5 %
BUN SERPL-MCNC: 11 MG/DL (ref 7–20)
CALCIUM SERPL-MCNC: 8.7 MG/DL (ref 8.3–10.6)
CHLORIDE SERPL-SCNC: 98 MMOL/L (ref 99–110)
CO2 SERPL-SCNC: 23 MMOL/L (ref 21–32)
CREAT SERPL-MCNC: 1.1 MG/DL (ref 0.9–1.3)
DEPRECATED RDW RBC AUTO: 29.6 % (ref 12.4–15.4)
EOSINOPHIL # BLD: 0.2 K/UL (ref 0–0.6)
EOSINOPHIL NFR BLD: 1.8 %
GFR SERPLBLD CREATININE-BSD FMLA CKD-EPI: 82 ML/MIN/{1.73_M2}
GLUCOSE SERPL-MCNC: 124 MG/DL (ref 70–99)
HCT VFR BLD AUTO: 25.7 % (ref 40.5–52.5)
HGB BLD-MCNC: 8.2 G/DL (ref 13.5–17.5)
LYMPHOCYTES # BLD: 1 K/UL (ref 1–5.1)
LYMPHOCYTES NFR BLD: 10.8 %
MAGNESIUM SERPL-MCNC: 1.74 MG/DL (ref 1.8–2.4)
MCH RBC QN AUTO: 20.9 PG (ref 26–34)
MCHC RBC AUTO-ENTMCNC: 31.8 G/DL (ref 31–36)
MCV RBC AUTO: 65.9 FL (ref 80–100)
MONOCYTES # BLD: 0.7 K/UL (ref 0–1.3)
MONOCYTES NFR BLD: 7.6 %
NEUTROPHILS # BLD: 7.4 K/UL (ref 1.7–7.7)
NEUTROPHILS NFR BLD: 79.3 %
PLATELET # BLD AUTO: 374 K/UL (ref 135–450)
PMV BLD AUTO: 8.4 FL (ref 5–10.5)
POTASSIUM SERPL-SCNC: 3.3 MMOL/L (ref 3.5–5.1)
RBC # BLD AUTO: 3.9 M/UL (ref 4.2–5.9)
SODIUM SERPL-SCNC: 132 MMOL/L (ref 136–145)
WBC # BLD AUTO: 9.3 K/UL (ref 4–11)

## 2025-06-06 PROCEDURE — 6370000000 HC RX 637 (ALT 250 FOR IP)

## 2025-06-06 PROCEDURE — 2500000003 HC RX 250 WO HCPCS: Performed by: UROLOGY

## 2025-06-06 PROCEDURE — 36415 COLL VENOUS BLD VENIPUNCTURE: CPT

## 2025-06-06 PROCEDURE — 80048 BASIC METABOLIC PNL TOTAL CA: CPT

## 2025-06-06 PROCEDURE — 83735 ASSAY OF MAGNESIUM: CPT

## 2025-06-06 PROCEDURE — 6370000000 HC RX 637 (ALT 250 FOR IP): Performed by: NURSE PRACTITIONER

## 2025-06-06 PROCEDURE — 85025 COMPLETE CBC W/AUTO DIFF WBC: CPT

## 2025-06-06 RX ORDER — TRAMADOL HYDROCHLORIDE 50 MG/1
50 TABLET ORAL EVERY 6 HOURS PRN
Qty: 28 TABLET | Refills: 0 | Status: SHIPPED | OUTPATIENT
Start: 2025-06-06 | End: 2025-06-13

## 2025-06-06 RX ADMIN — METHOCARBAMOL 750 MG: 750 TABLET ORAL at 09:03

## 2025-06-06 RX ADMIN — METHOCARBAMOL 750 MG: 750 TABLET ORAL at 02:05

## 2025-06-06 RX ADMIN — TRAMADOL HYDROCHLORIDE 50 MG: 50 TABLET, COATED ORAL at 06:55

## 2025-06-06 RX ADMIN — SODIUM CHLORIDE, PRESERVATIVE FREE 10 ML: 5 INJECTION INTRAVENOUS at 07:28

## 2025-06-06 RX ADMIN — OXYCODONE 5 MG: 5 TABLET ORAL at 09:03

## 2025-06-06 RX ADMIN — OXYCODONE 5 MG: 5 TABLET ORAL at 02:03

## 2025-06-06 ASSESSMENT — PAIN SCALES - GENERAL
PAINLEVEL_OUTOF10: 8
PAINLEVEL_OUTOF10: 6
PAINLEVEL_OUTOF10: 8
PAINLEVEL_OUTOF10: 6

## 2025-06-06 ASSESSMENT — PAIN DESCRIPTION - ORIENTATION
ORIENTATION: LOWER
ORIENTATION: LOWER

## 2025-06-06 ASSESSMENT — PAIN - FUNCTIONAL ASSESSMENT
PAIN_FUNCTIONAL_ASSESSMENT: ACTIVITIES ARE NOT PREVENTED
PAIN_FUNCTIONAL_ASSESSMENT: ACTIVITIES ARE NOT PREVENTED

## 2025-06-06 ASSESSMENT — PAIN DESCRIPTION - LOCATION
LOCATION: BACK
LOCATION: BACK

## 2025-06-06 ASSESSMENT — PAIN DESCRIPTION - DESCRIPTORS
DESCRIPTORS: BURNING
DESCRIPTORS: BURNING

## 2025-06-06 NOTE — CARE COORDINATION
Case Management Assessment            Discharge Note                    Date / Time of Note: 6/6/2025 10:23 AM                  Discharge Note Completed by: Faye Carbajal    Patient Name: Bonifacio Wolfe   YOB: 1975  Diagnosis: Accidental drug overdose, initial encounter [T50.901A]  Drug overdose, accidental or unintentional, initial encounter [T50.901A]  Anemia, unspecified type [D64.9]   Date / Time: 5/30/2025  8:42 AM    Current PCP: No primary care provider on file.  Clinic patient: No    Hospitalization in the last 30 days: No       Advance Directives:  Code Status: Full Code  [place] DNR form completed and on chart: No    Financial:  Payor: [facility] MEDICAID / Plan: [facility] MEDICAID / Product Type: *No Product type* /      Pharmacy:    [id number] - [place], [place] - [address] - P [phone] - F [phone] [address]  [facility] [address]  Phone: [phone] Fax: [phone]      Assistance purchasing medications?: Potential Assistance Purchasing Medications: No  Assistance provided by Case Management: None at this time    Does patient want to participate in local refill/ meds to beds program?:      Meds To Beds General Rules:  1. Can ONLY be done Monday- Friday between 8:30am-5pm  2. Prescription(s) must be in pharmacy by 3pm to be filled same day  3.Copy of patient's insurance/ prescription drug card and patient face sheet must be sent along with the prescription(s)  4. Cost of Rx cannot be added to hospital bill. If financial assistance is needed, please contact unit  or ;  or  CANNOT provide pharmacy voucher for patients co-pays  5. Patients can then  the prescription on their way out of the hospital at discharge, or pharmacy can deliver to the bedside if staff is available. (payment due at time of pick-up or delivery - cash, check, or card accepted)     Able to afford home medications/

## 2025-06-06 NOTE — PROGRESS NOTES
Hospital Medicine Progress Note  V 5.17      Date of Admission: 5/30/2025    Hospital Day: 6      Chief Admission Complaint:  drug overdose     Subjective:  Patient seen and examined at bedside. No acute events overnight. Metastatic urothelial carcinoma suspected by primary tumor path and evidence of spread on imaging to lungs, lung mets have not been biopsy confirmed, but overall picture likely metastatic high grade urothelial carcinoma. Very difficult social situation. Discussion to be had tomorrow during IDRs.    Presenting Admission History:       Bonifacio Wolfe is a 49 y.o. male with pmh of bladder tumor, drug use, chronic anemia related to his bladder tumor who presents after having been found down, unconscious at a [facility] bathroom. On EMS arrival, patient received Narcan and woke up afterwards. Reportedly patient notes that someone gave him a pill today and he thought it was tylenol when he took it. On arrival, patient still a bit somnolent though arousable. Patient somewhat hypotensive on arrival as well, received IV fluid boluses in the ER. Also noted to be significantly anemic with hemoglobin of 4.7. Patient has had this problem in the past and reports it's related to the hematuria from his bladder tumor. He has not had this tumor treated yet, though apparently follows with  Urology. Patient ordered for 2 units of pRBCs in the ER. Patient is being admitted for further care regarding his severe anemia.     Assessment/Plan:      Drug overdose  - Patient presented via EMS, who were called for unresponsive patient in [facility] bathroom. Received Narcan which did wake him up. Reports he took a pill from someone, though it was tylenol  - Tylenol, salicylate levels negative. VBG with pH 7.333, pCO2 49.3. UDS positive for benzodiazepines, cocaine, opiates, and fentanyl   - Patient initially hypotensive. Received 2L IV fluid boluses. Also being given blood due to severe anemia. Blood pressure was 
      Hospital Medicine Progress Note  V 5.17      Date of Admission: 5/30/2025    Hospital Day: 7      Chief Admission Complaint:  drug overdose     Subjective: transfer to  initiated, awaiting approval. Patient is without complaints. No adverse interval events.      Presenting Admission History:       Bonifacio Wolfe is a 49 y.o. male with pmh of bladder tumor, drug use, chronic anemia related to his bladder tumor who presents after having been found down, unconscious at a [facility] bathroom. On EMS arrival, patient received Narcan and woke up afterwards. Reportedly patient notes that someone gave him a pill today and he thought it was tylenol when he took it. On arrival, patient still a bit somnolent though arousable. Patient somewhat hypotensive on arrival as well, received IV fluid boluses in the ER. Also noted to be significantly anemic with hemoglobin of 4.7. Patient has had this problem in the past and reports it's related to the hematuria from his bladder tumor. He has not had this tumor treated yet, though apparently follows with  Urology. Patient ordered for 2 units of pRBCs in the ER. Patient is being admitted for further care regarding his severe anemia.     Assessment/Plan:      Drug overdose  - Patient presented via EMS, who were called for unresponsive patient in [facility] bathroom. Received Narcan which did wake him up. Reports he took a pill from someone, though it was tylenol  - Tylenol, salicylate levels negative. VBG with pH 7.333, pCO2 49.3. UDS positive for benzodiazepines, cocaine, opiates, and fentanyl   - Patient initially hypotensive. Received 2L IV fluid boluses. Also being given blood due to severe anemia. Blood pressure was responsive  - Will monitor on telemetry. Monitor on COWS protocol    Metastatic urothelial carcinoma  - Patient reports history of bladder tumor with urinary retention and chronic hematuria  - Urology consulted, appreciate their recommendations   - CT abd/pelvis showed 
      [facility] Hospital Medicine Progress Note  V 5.17      Date of Admission: 5/30/2025    Hospital Day: 3      Chief Admission Complaint:  drug overdose     Subjective:  Patient seen and examined at bedside. No acute events overnight. Going to OR today.     Presenting Admission History:       Bonifacio Wolfe is a 49 y.o. male with pmh of bladder tumor, drug use, chronic anemia related to his bladder tumor who presents after having been found down, unconscious at a [facility] bathroom. On EMS arrival, patient received Narcan and woke up afterwards. Reportedly patient notes that someone gave him a pill today and he thought it was tylenol when he took it. On arrival, patient still a bit somnolent though arousable. Patient somewhat hypotensive on arrival as well, received IV fluid boluses in the ER. Also noted to be significantly anemic with hemoglobin of 4.7. Patient has had this problem in the past and reports it's related to the hematuria from his bladder tumor. He has not had this tumor treated yet, though apparently follows with  Urology. Patient ordered for 2 units of pRBCs in the ER. Patient is being admitted for further care regarding his severe anemia.     Assessment/Plan:      Drug overdose  - Patient presented via EMS, who were called for unresponsive patient in [facility] bathroom. Received Narcan which did wake him up. Reports he took a pill from someone, though it was tylenol  - Tylenol, salicylate levels negative. VBG with pH 7.333, pCO2 49.3. UDS positive for benzodiazepines, cocaine, opiates, and fentanyl   - Patient initially hypotensive. Received 2L IV fluid boluses. Also being given blood due to severe anemia. Blood pressure was responsive  - Will monitor on telemetry. Monitor on COWS protocol     NICK  - Cr 1.3 on admission, baseline ~0.7-0.8. Received IV fluids on admission. Will repeat tomorrow.   - Cr stable at 1.2. May be related to obstruction due to his bladder mass leading to bilateral hydronephrosis  - 
      [facility] Medicine Progress Note  V 5.17      Date of Admission: 5/30/2025    Hospital Day: 5      Chief Admission Complaint:  drug overdose     Subjective:  Patient seen and examined at bedside. No acute events overnight. CBI stopped. Awaiting path for suspected bladder cancer.    Presenting Admission History:       Bonifacio Wolfe is a 49 y.o. male with pmh of bladder tumor, drug use, chronic anemia related to his bladder tumor who presents after having been found down, unconscious at a [facility] bathroom. On EMS arrival, patient received Narcan and woke up afterwards. Reportedly patient notes that someone gave him a pill today and he thought it was tylenol when he took it. On arrival, patient still a bit somnolent though arousable. Patient somewhat hypotensive on arrival as well, received IV fluid boluses in the ER. Also noted to be significantly anemic with hemoglobin of 4.7. Patient has had this problem in the past and reports it's related to the hematuria from his bladder tumor. He has not had this tumor treated yet, though apparently follows with  Urology. Patient ordered for 2 units of pRBCs in the ER. Patient is being admitted for further care regarding his severe anemia.     Assessment/Plan:      Drug overdose  - Patient presented via EMS, who were called for unresponsive patient in [facility] bathroom. Received Narcan which did wake him up. Reports he took a pill from someone, though it was tylenol  - Tylenol, salicylate levels negative. VBG with pH 7.333, pCO2 49.3. UDS positive for benzodiazepines, cocaine, opiates, and fentanyl   - Patient initially hypotensive. Received 2L IV fluid boluses. Also being given blood due to severe anemia. Blood pressure was responsive  - Will monitor on telemetry. Monitor on COWS protocol     NICK  - Cr 1.3 on admission, baseline ~0.7-0.8. Received IV fluids on admission. Will repeat tomorrow.   - Cr stable at 1.2. May be related to obstruction due to his bladder mass 
     Urology Attending Progress Note      Subjective: No complaints. CBI had to be restarted yesterday due to increased hematuria.     Vitals:  BP (!) 143/84   Pulse 64   Temp 98.3 °F (36.8 °C) (Oral)   Resp 18   Ht 1.803 m (5' 11\")   Wt 70.2 kg (154 lb 11.2 oz)   SpO2 95%   BMI 21.58 kg/m²   Temp  Av.5 °F (36.9 °C)  Min: 98 °F (36.7 °C)  Max: 99.1 °F (37.3 °C)    Intake/Output Summary (Last 24 hours) at 6/3/2025 0950  Last data filed at 6/3/2025 0636  Gross per 24 hour   Intake 5 ml   Output 3725 ml   Net -3720 ml       Exam: Sitting in chair. Appears comfortable. Urine clear on low CBI    Labs:  WBC:    Lab Results   Component Value Date/Time    WBC 13.8 2025 02:43 AM     Hemoglobin/Hematocrit:    Lab Results   Component Value Date/Time    HGB 7.7 2025 02:43 AM    HCT 24.3 2025 02:43 AM     BMP:    Lab Results   Component Value Date/Time     2025 02:43 AM    K 3.4 2025 02:43 AM     2025 02:43 AM    CO2 23 2025 02:43 AM    BUN 11 2025 02:43 AM    CREATININE 1.2 2025 02:43 AM    CALCIUM 8.5 2025 02:43 AM    GFRAA >60 2016 08:52 AM    LABGLOM 74 2025 02:43 AM    LABGLOM >90 2024 09:42 PM     PT/INR:    Lab Results   Component Value Date/Time    PROTIME 13.9 2024 07:41 PM    INR 1.04 2024 07:41 PM     PTT:    Lab Results   Component Value Date/Time    APTT 25.8 2024 07:41 PM   [APTT      Impression/Plan: 48 yo with advanced bladder cancer pod2 s/p large turbt    -CBI restarted yesterday due to ongoing hematuria. No issues with drainage overnight  -Appears clear again this AM. Stopped CBI  -Await pathology results  -Please call with any questions    DENISE Sanchez  
     Urology Attending Progress Note      Subjective: No issues voiding. Urine reportedly clear yellow    Vitals:  BP (!) 170/88 Comment: After returning from bathroom  Pulse 64   Temp 97.5 °F (36.4 °C) (Oral)   Resp 16   Ht 1.803 m (5' 11\")   Wt 68.7 kg (151 lb 6.4 oz)   SpO2 95%   BMI 21.12 kg/m²   Temp  Av.9 °F (36.6 °C)  Min: 97 °F (36.1 °C)  Max: 98.5 °F (36.9 °C)    Intake/Output Summary (Last 24 hours) at 2025 0945  Last data filed at 2025 0418  Gross per 24 hour   Intake 1110 ml   Output 1500 ml   Net -390 ml       Exam: Laying in bed, appears comfortable. No distress    Labs:  WBC:    Lab Results   Component Value Date/Time    WBC 9.3 2025 04:47 AM     Hemoglobin/Hematocrit:    Lab Results   Component Value Date/Time    HGB 8.2 2025 04:47 AM    HCT 25.7 2025 04:47 AM     BMP:    Lab Results   Component Value Date/Time     2025 04:47 AM    K 3.3 2025 04:47 AM    CL 98 2025 04:47 AM    CO2 23 2025 04:47 AM    BUN 11 2025 04:47 AM    CREATININE 1.1 2025 04:47 AM    CALCIUM 8.7 2025 04:47 AM    GFRAA >60 2016 08:52 AM    LABGLOM 82 2025 04:47 AM    LABGLOM >90 2024 09:42 PM     PT/INR:    Lab Results   Component Value Date/Time    PROTIME 13.9 2024 07:41 PM    INR 1.04 2024 07:41 PM     PTT:    Lab Results   Component Value Date/Time    APTT 25.8 2024 07:41 PM   [APTT      Impression/Plan: 50 yo with advanced bladder cancer pod5 s/p large turbt    -Voiding well since reyes removal. Urine is reportedly clear  -Cr remains stable at 1.1. Discussed with medicine, will hold off on nephrostomy tube placement at this time  -He will FU with  Urology/oncology for next steps. Please call with any questions, DC today    DENISE Sanchez  
     Urology Attending Progress Note      Subjective: No issues with voiding, doing well since reyes removal. No complaints    Vitals:  /77   Pulse 64   Temp 98 °F (36.7 °C) (Axillary)   Resp 18   Ht 1.803 m (5' 11\")   Wt 68.7 kg (151 lb 8 oz)   SpO2 96%   BMI 21.13 kg/m²   Temp  Av.1 °F (36.7 °C)  Min: 98 °F (36.7 °C)  Max: 98.4 °F (36.9 °C)    Intake/Output Summary (Last 24 hours) at 2025 0914  Last data filed at 2025 0704  Gross per 24 hour   Intake 580 ml   Output 1650 ml   Net -1070 ml       Exam: Laying in bed, appears comfortable. No distress    Labs:  WBC:    Lab Results   Component Value Date/Time    WBC 11.3 2025 02:32 AM     Hemoglobin/Hematocrit:    Lab Results   Component Value Date/Time    HGB 8.0 2025 02:32 AM    HCT 25.2 2025 02:32 AM     BMP:    Lab Results   Component Value Date/Time     2025 02:32 AM    K 3.7 2025 02:32 AM    CL 99 2025 02:32 AM    CO2 23 2025 02:32 AM    BUN 12 2025 02:32 AM    CREATININE 1.2 2025 02:32 AM    CALCIUM 8.7 2025 02:32 AM    GFRAA >60 2016 08:52 AM    LABGLOM 74 2025 02:32 AM    LABGLOM >90 2024 09:42 PM     PT/INR:    Lab Results   Component Value Date/Time    PROTIME 13.9 2024 07:41 PM    INR 1.04 2024 07:41 PM     PTT:    Lab Results   Component Value Date/Time    APTT 25.8 2024 07:41 PM   [APTT      Impression/Plan: 48 yo with advanced bladder cancer pod4 s/p large turbt    -Reyes removed yesterday and he has been voiding well. No complaints  -Cr 1.2  -There was discussion of bilateral nephrostomy tube placement for urinary diversion but renal function normal at this time. We will hold off on tube placement.   -CT chest showing pulmonary metastatic disease  -Will continue to follow. Continue with current management      DENISE Sanchez  
     Urology Attending Progress Note      Subjective: Resting on rounds    Vitals:  BP (!) 155/90   Pulse 70   Temp 98.4 °F (36.9 °C) (Oral)   Resp 18   Ht 1.803 m (5' 11\")   Wt 68 kg (149 lb 14.6 oz)   SpO2 97%   BMI 20.91 kg/m²   Temp  Av.4 °F (36.9 °C)  Min: 98 °F (36.7 °C)  Max: 99.2 °F (37.3 °C)    Intake/Output Summary (Last 24 hours) at 2025 0948  Last data filed at 2025 0616  Gross per 24 hour   Intake 690 ml   Output 2650 ml   Net -1960 ml       Exam: Laying in bed, CBI off. Urine appears yellow with sediment     Labs:  WBC:    Lab Results   Component Value Date/Time    WBC 12.3 2025 02:59 AM     Hemoglobin/Hematocrit:    Lab Results   Component Value Date/Time    HGB 7.7 2025 02:59 AM    HCT 24.1 2025 02:59 AM     BMP:    Lab Results   Component Value Date/Time     2025 02:59 AM    K 3.4 2025 02:59 AM     2025 02:59 AM    CO2 23 2025 02:59 AM    BUN 13 2025 02:59 AM    CREATININE 1.2 2025 02:59 AM    CALCIUM 8.6 2025 02:59 AM    GFRAA >60 2016 08:52 AM    LABGLOM 74 2025 02:59 AM    LABGLOM >90 2024 09:42 PM     PT/INR:    Lab Results   Component Value Date/Time    PROTIME 13.9 2024 07:41 PM    INR 1.04 2024 07:41 PM     PTT:    Lab Results   Component Value Date/Time    APTT 25.8 2024 07:41 PM   [APTT      Impression/Plan: 50 yo with advanced bladder cancer pod3 s/p large turbt    -CBI remained off and urine yellow this AM with sediment  -Ok for reyes removal, will place orders  -Pathology showing invasive high-grade urothelial carcinoma with muscle invasion. Will discuss with patient and Dr Melvin for next steps  -Case management following    DENISE Sanchez  
     Urology Attending Progress Note      Subjective: no complaints    Vitals:  BP (!) 154/88   Pulse 67   Temp 98.9 °F (37.2 °C) (Oral)   Resp 18   Ht 1.803 m (5' 11\")   Wt 75.2 kg (165 lb 11.2 oz)   SpO2 99%   BMI 23.11 kg/m²   Temp  Av.3 °F (36.8 °C)  Min: 97.2 °F (36.2 °C)  Max: 99 °F (37.2 °C)    Intake/Output Summary (Last 24 hours) at 2025 1211  Last data filed at 2025 1025  Gross per 24 hour   Intake 2333.11 ml   Output -3490 ml   Net 5823.11 ml       Exam: nad  Abd soft  Urine clear on minimal cbi    Labs:  WBC:    Lab Results   Component Value Date/Time    WBC 17.2 2025 06:29 AM     Hemoglobin/Hematocrit:    Lab Results   Component Value Date/Time    HGB 7.8 2025 06:29 AM    HCT 25.1 2025 06:29 AM     BMP:    Lab Results   Component Value Date/Time     2025 06:29 AM    K 3.6 2025 06:29 AM     2025 06:29 AM    CO2 18 2025 06:29 AM    BUN 12 2025 06:29 AM    CREATININE 1.3 2025 06:29 AM    CALCIUM 8.7 2025 06:29 AM    GFRAA >60 2016 08:52 AM    LABGLOM 67 2025 06:29 AM    LABGLOM >90 2024 09:42 PM     PT/INR:    Lab Results   Component Value Date/Time    PROTIME 13.9 2024 07:41 PM    INR 1.04 2024 07:41 PM     PTT:    Lab Results   Component Value Date/Time    APTT 25.8 2024 07:41 PM   [APTT      Impression/Plan: 48 yo with advanced bladder cancer pod1 s/p large turbt  -urine looks very clear  -ok to plug CBI.   -reyes out tues am if clear.  -social work consult?    AMARI GUERRERO MD  
     Urology Progress Note      BP (!) 149/91 Comment: back from CT  Pulse 78   Temp 98.4 °F (36.9 °C) (Oral)   Resp 18   Ht 1.803 m (5' 11\")   Wt 75.8 kg (167 lb)   SpO2 98%   BMI 23.29 kg/m²   Temp  Av.2 °F (36.8 °C)  Min: 97.3 °F (36.3 °C)  Max: 98.6 °F (37 °C)    CT SCAN SHOWS LARGE BLADDER TUMOR AND BILATERAL HYDRO.  WILL MAKE NPO AFTER MIDNIGHT. LIKELY WILL NEED TURBT, CLOT EVAC      AMARI GUERRERO MD  
4 Eyes Skin Assessment     NAME:  Bonifacio Wolfe  YOB: 1975  MEDICAL RECORD NUMBER:  [id number]    The patient is being assessed for  Admission    I agree that at least one RN has performed a thorough Head to Toe Skin Assessment on the patient. ALL assessment sites listed below have been assessed.      Areas assessed by both nurses:    Head, Face, Ears, Shoulders, Back, Chest, Arms, Elbows, Hands, Sacrum. Buttock, Coccyx, Ischium, Legs. Feet and Heels, and Under Medical Devices         Does the Patient have a Wound? Yes wound(s) were present on assessment. LDA wound assessment was Initiated and completed by RN    X2 abrasions L foot great toe, abrasion to top of left hand       Rubens Prevention initiated by RN: No  Wound Care Orders initiated by RN: No    Pressure Injury (Stage 3,4, Unstageable, DTI, NWPT, and Complex wounds) if present, place Wound referral order by RN under : No    New Ostomies, if present place, Ostomy referral order under : No     Nurse 1 eSignature: Electronically signed by Lizbet Cruz RN on 5/30/25 at 5:07 PM EDT    **SHARE this note so that the co-signing nurse can place an eSignature**    Nurse 2 eSignature: Electronically signed by Brandon Loomis RN on 5/30/25 at 7:00 PM EDT    
Occupational Therapy  Facility/Department: [facility] PCU  Occupational Therapy Initial Assessment, Treatment, Discharge     Name: Bonifacio Wolfe  : 1975  MRN: [id number]  Date of Service: 6/3/2025    Discharge Recommendations:  Home with assist PRN       Patient Diagnosis(es): The primary encounter diagnosis was Accidental drug overdose, initial encounter. Diagnoses of Anemia, unspecified type, Leg edema, left, Leg edema, right, and Bladder mass were also pertinent to this visit.  Past Medical History:  has a past medical history of Affective disorder, Diabetes mellitus (HCC), Hepatitis C, Homeless, Knee pain, Osteoarthritis, Overdose, Pneumonia, and Sciatica.  Past Surgical History:  has a past surgical history that includes fracture surgery; knee surgery; Tibia fracture surgery; and TURP (N/A, 2025).       Assessment  Performance deficits / Impairments: Decreased functional mobility ;Decreased balance;Decreased strength  Assessment: Pt tolerated therapy evaluation well. Per pt he was ind with ADLs and ambulated short distances pushing his WC at baseline. Today, pt completed functional transfers/mobility CGA with use of RW for safety. He completed toileting tasks supervision>ind without difficulty. He does require increased time to initiate tasks requiring cues.Pt declines need for RW and or wanting a new one. At this time pt is funcitoning at his baseline. If there is a change in functional status please re-order OT. OT signing off at this time, thank you.  Prognosis: Good  Decision Making: Medium Complexity  No Skilled OT: Independent with ADL's;At baseline function  REQUIRES OT FOLLOW-UP: No  Activity Tolerance  Activity Tolerance: Patient Tolerated treatment well     Plan  Occupational Therapy Plan  Times Per Week: DC  Current Treatment Recommendations: Self-Care / ADL, Functional mobility training, Balance training, Strengthening    Restrictions  Position Activity Restriction  Other Position/Activity 
PACU Transfer Note    Vitals:    06/01/25 1345   BP: 135/83   Pulse: 68   Resp: 13   Temp: 97.9   SpO2: 100%     Patient denies nausea at this time.  In: 900 [I.V.:900]  Out: -1925     Pain assessment:  present - adequately treated      Report given to Receiving unit RN, Angel. Patient transported to [facility] by S[facility].    6/1/2025 2:27 PM      
Patient CBI stopped for a short period of time, urine from clear to Pink with some small clots, CBI restarted, will continue to monitor.  
Patient brought to PACU #9 from the OR. Patient placed on monitors. Report received from RN and Dr. Dexter. Lucia in pain. PRNs to be given.  
Patient discharged from room 4307 and left via lyft/uber. AVS reviewed with patient, all questions asked and answered at this time. PIV and telemetry removed from patient. All belongings sent with patient/family.     Electronically signed by Maite Bazan RN on 6/6/2025 at 10:35 AM      
Physical Therapy  [address]  [address] Therapy Initial Assessment    Name: Bonifacio Wolfe  : 1975  MRN: [id number]  Date of Service: 6/3/2025    Discharge Recommendations:  Home with assist PRN   PT Equipment Recommendations  Equipment Needed:  (recommend RW for home, however pt declined. states he pushes w/c. doesn't want anymore \"stuff\".)      Patient Diagnosis(es): The primary encounter diagnosis was Accidental drug overdose, initial encounter. Diagnoses of Anemia, unspecified type, Leg edema, left, Leg edema, right, and Bladder mass were also pertinent to this visit.  Past Medical History:  has a past medical history of Affective disorder, Diabetes mellitus (HCC), Hepatitis C, Homeless, Knee pain, Osteoarthritis, Overdose, Pneumonia, and Sciatica.  Past Surgical History:  has a past surgical history that includes fracture surgery; knee surgery; Tibia fracture surgery; and TURP (N/A, 2025).    Assessment  Body Structures, Functions, Activity Limitations Requiring Skilled Therapeutic Intervention: Decreased functional mobility   Assessment: Pt functioning below, but not far from his baseline at this time. Anticipate he will return to homeless shelter upon D/C. Recommend RW, however pt refusing. Will follow while here to maximize independence.  Treatment Diagnosis: decreased mobility  Therapy Prognosis: Good  Decision Making: Medium Complexity  Requires PT Follow-Up: Yes  Activity Tolerance  Activity Tolerance: Patient tolerated evaluation without incident;Patient limited by pain    Plan  Physical Therapy Plan  General Plan:  (2-5)  Current Treatment Recommendations: Functional mobility training, Transfer training, Endurance training, Gait training, Safety education & training, Therapeutic activities  Safety Devices  Type of Devices: Left in chair, Chair alarm in place, Call light within reach, Nurse notified    Restrictions  Position Activity Restriction  Other Position/Activity 
Pt scored 12 on COWs assessment.  Refuses any meds at this time.  States since he just took tramadol and robaxin, he doesn't want to take anything else.  
Pt to surgery  
Spiritual Health History and Assessment/Progress Note  [facility]    Loneliness/Social Isolation,  ,  ,  Spiritual care introduced to patient    Name: Bonifacio Wolfe MRN: [id number]    Age: 49 y.o.     Sex: male   Language: English   Jain: Jew   Drug overdose, accidental or unintentional, initial encounter     Date: 6/2/2025            Total Time Calculated: 11 min              Spiritual Assessment began in [facility] PCU        Referral/Consult From: Rounding   Encounter Overview/Reason: Loneliness/Social Isolation  Service Provided For: Patient    Madie, Belief, Meaning:   Patient has beliefs or practices that help with coping during difficult times  Family/Friends No family/friends present      Importance and Influence:  Patient has no beliefs influential to healthcare decision-making identified during this visit  Family/Friends No family/friends present    Community:  Patient feels well-supported. Support system includes: Unknown  Family/Friends No family/friends present    Assessment and Plan of Care:     Patient Interventions include: Facilitated expression of thoughts and feelings and Affirmed coping skills/support systems  Family/Friends Interventions include: No family/friends present    Patient Plan of Care: No spiritual needs identified for follow-up and Spiritual Care available upon further referral  Family/Friends Plan of Care: No family/friends present    Electronically signed by IMSAEL Vargas on 6/2/2025 at 1:12 PM    
Three way reyes catheter removed per MD order.30cc balloon removed. No complication noted.   
TV\"    Objective                Strength LLE  Comment: 0/5 L ankle DF - pt states chronic           Bed mobility  Supine to Sit: Minimal assistance (cues for log roll)  Scooting: Contact guard assistance  Bed Mobility Comments: after cues given to initiate bed mobility, pt was impulsive and moved quickly  Transfers  Sit to Stand: Minimal Assistance  Stand to Sit: Minimal Assistance  Ambulation  Device: Rolling Walker  Assistance: Minimal assistance  Quality of Gait: impulsive, unsteady, cues to keep walker close and to turn around fully prior to sitting  Gait Deviations: Slow Yakelin;Decreased step length;Decreased step height  Distance: 2 ft  Comments: distance limited due to back pain     Balance  Sitting - Static: Fair (SBA 2* impulsivity)  Standing - Static: Fair  Standing - Dynamic: Fair (min A with RW)  Exercise Treatment: x 10 B heel slides, hip ABD, LAQ, R ankle DF with cues and supervision. Max cues to stay on task and for completion of task       OutComes Score                                                  AM-PAC - Mobility    AM-PAC Basic Mobility - Inpatient   How much help is needed turning from your back to your side while in a flat bed without using bedrails?: A Little  How much help is needed moving from lying on your back to sitting on the side of a flat bed without using bedrails?: A Little  How much help is needed moving to and from a bed to a chair?: A Little  How much help is needed standing up from a chair using your arms?: A Little  How much help is needed walking in hospital room?: A Lot  How much help is needed climbing 3-5 steps with a railing?: A Lot  AM-PAC Inpatient Mobility Raw Score : 16  AM-PAC Inpatient T-Scale Score : 40.78  Mobility Inpatient CMS 0-100% Score: 54.16  Mobility Inpatient CMS G-Code Modifier : CK         Tinneti Score       Goals  Short Term Goals  Time Frame for Short Term Goals: D/C  Short Term Goal 1: supine<->sit independent  ongoing  Short Term Goal 2: 
growth at 18 to 36 hours 09/25/2024 07:26 PM     Blood Cultures: No results found for: \"BC\"  No results found for: \"BLOODCULT2\"  Organism: No results found for: \"ORG\"      Tyrell Carl,     
interpreted for clinical significance as documented above.     Recent Labs     05/31/25  0347 05/31/25  0907 06/01/25  0518 06/01/25  1917 06/02/25  0629   WBC 9.7  --  9.4  --  17.2*   HGB 6.7*   < > 7.3* 8.2* 7.8*   HCT 21.2*   < > 23.4* 26.4* 25.1*   *  --  394  --  425    < > = values in this interval not displayed.     Recent Labs     05/31/25  0347 06/01/25  0518 06/02/25  0629    137 134*   K 3.6 3.4* 3.6    101 100   CO2 27 25 18*   BUN 20 16 12   CREATININE 1.2 1.0 1.3   CALCIUM 8.9 8.6 8.7   MG  --  1.49*  --      Recent Labs     05/30/25  0929 05/30/25  1052   PROBNP 359*  --    TROPHS 19 18     No results for input(s): \"LABA1C\" in the last 72 hours.  Recent Labs     05/30/25  0929   AST 55*   ALT 28   BILITOT <0.2   ALKPHOS 76     Recent Labs     05/30/25  0929   LACTA 1.7       Urine Cultures:   Lab Results   Component Value Date/Time    LABURIN No growth at 18 to 36 hours 05/30/2025 10:46 AM     Blood Cultures: No results found for: \"BC\"  No results found for: \"BLOODCULT2\"  Organism: No results found for: \"ORG\"      Tyrell Carl, DO    
not aware that he was taking opioid.  Patient was initially hypotensive-, responded to fluid bolus    Bilateral lower extremity edema, right more than left  -CT imaging reveals extrinsic compression from lymphadenopathy leading to bilateral hydronephrosis  - Evaluated by urology-bilateral nephrostomy deferred for now.      ONCOLOGIC DISPOSITION:      MANOLO JOSEPH MD  Please contact through [employer]

## 2025-06-06 NOTE — PLAN OF CARE
Problem: Chronic Conditions and Co-morbidities  Goal: Patient's chronic conditions and co-morbidity symptoms are monitored and maintained or improved  5/31/2025 1348 by Zoë Payne RN  Outcome: Progressing  Flowsheets (Taken 5/31/2025 1348)  Care Plan - Patient's Chronic Conditions and Co-Morbidity Symptoms are Monitored and Maintained or Improved:   Monitor and assess patient's chronic conditions and comorbid symptoms for stability, deterioration, or improvement   Collaborate with multidisciplinary team to address chronic and comorbid conditions and prevent exacerbation or deterioration   Update acute care plan with appropriate goals if chronic or comorbid symptoms are exacerbated and prevent overall improvement and discharge     Problem: Discharge Planning  Goal: Discharge to home or other facility with appropriate resources  5/31/2025 1348 by Zoë Payne RN  Outcome: Progressing  Flowsheets (Taken 5/31/2025 1348)  Discharge to home or other facility with appropriate resources: Identify barriers to discharge with patient and caregiver     Problem: Pain  Goal: Verbalizes/displays adequate comfort level or baseline comfort level  5/31/2025 1348 by Zoë Payne RN  Outcome: Progressing  Flowsheets (Taken 5/31/2025 1348)  Verbalizes/displays adequate comfort level or baseline comfort level: Encourage patient to monitor pain and request assistance     Problem: ABCDS Injury Assessment  Goal: Absence of physical injury  5/31/2025 1348 by Zoë Payne RN  Outcome: Progressing  Flowsheets (Taken 5/31/2025 1348)  Absence of Physical Injury: Implement safety measures based on patient assessment     Problem: Safety - Adult  Goal: Free from fall injury  5/31/2025 1348 by Zoë Payne RN  Outcome: Progressing  Flowsheets (Taken 5/31/2025 1348)  Free From Fall Injury: Instruct family/caregiver on patient safety     
  Problem: Chronic Conditions and Co-morbidities  Goal: Patient's chronic conditions and co-morbidity symptoms are monitored and maintained or improved  6/2/2025 1231 by Angel Butler, RN  Outcome: Progressing  Flowsheets (Taken 6/2/2025 1200)  Care Plan - Patient's Chronic Conditions and Co-Morbidity Symptoms are Monitored and Maintained or Improved:   Collaborate with multidisciplinary team to address chronic and comorbid conditions and prevent exacerbation or deterioration   Update acute care plan with appropriate goals if chronic or comorbid symptoms are exacerbated and prevent overall improvement and discharge     Problem: Discharge Planning  Goal: Discharge to home or other facility with appropriate resources  6/2/2025 1231 by Angel Btuler, RN  Outcome: Progressing  Flowsheets (Taken 6/2/2025 1200)  Discharge to home or other facility with appropriate resources:   Arrange for needed discharge resources and transportation as appropriate   Identify barriers to discharge with patient and caregiver   Identify discharge learning needs (meds, wound care, etc)   Arrange for interpreters to assist at discharge as needed     Problem: Pain  Goal: Verbalizes/displays adequate comfort level or baseline comfort level  6/2/2025 1231 by Angel Butler, RN  Outcome: Progressing  Flowsheets (Taken 6/2/2025 0752)  Verbalizes/displays adequate comfort level or baseline comfort level: Encourage patient to monitor pain and request assistance     
  Problem: Chronic Conditions and Co-morbidities  Goal: Patient's chronic conditions and co-morbidity symptoms are monitored and maintained or improved  6/6/2025 1034 by Maite Bazan RN  Outcome: Adequate for Discharge  6/6/2025 0432 by Angel Giang RN  Outcome: Progressing     Problem: Discharge Planning  Goal: Discharge to home or other facility with appropriate resources  6/6/2025 1034 by Maite Bazan RN  Outcome: Adequate for Discharge  6/6/2025 0432 by Angel Giang RN  Outcome: Progressing     Problem: Pain  Goal: Verbalizes/displays adequate comfort level or baseline comfort level  6/6/2025 1034 by Maite Bazan RN  Outcome: Adequate for Discharge  6/6/2025 0432 by Angel Giang RN  Outcome: Progressing     Problem: ABCDS Injury Assessment  Goal: Absence of physical injury  6/6/2025 1034 by Maite Bazan RN  Outcome: Adequate for Discharge  6/6/2025 0432 by Angel Giang RN  Outcome: Progressing     Problem: Safety - Adult  Goal: Free from fall injury  6/6/2025 1034 by Maite Bazan RN  Outcome: Adequate for Discharge  6/6/2025 0432 by Angel Giang RN  Outcome: Progressing     Problem: Skin/Tissue Integrity  Goal: Skin integrity remains intact  Description: 1.  Monitor for areas of redness and/or skin breakdown2.  Assess vascular access sites hourly3.  Every 4-6 hours minimum:  Change oxygen saturation probe site4.  Every 4-6 hours:  If on nasal continuous positive airway pressure, respiratory therapy assess nares and determine need for appliance change or resting period  6/6/2025 1034 by Maite Bazan RN  Outcome: Adequate for Discharge  6/6/2025 0432 by Angel Giang RN  Outcome: Progressing     Problem: Genitourinary - Adult  Goal: Urinary catheter remains patent  6/6/2025 1034 by Maite Bazan RN  Outcome: Adequate for Discharge  6/6/2025 0432 by Angel Giang RN  Outcome: Progressing     
  Problem: Chronic Conditions and Co-morbidities  Goal: Patient's chronic conditions and co-morbidity symptoms are monitored and maintained or improved  Outcome: Progressing     Problem: Discharge Planning  Goal: Discharge to home or other facility with appropriate resources  Outcome: Progressing     Problem: Pain  Goal: Verbalizes/displays adequate comfort level or baseline comfort level  Outcome: Progressing     Problem: ABCDS Injury Assessment  Goal: Absence of physical injury  Outcome: Progressing     Problem: Safety - Adult  Goal: Free from fall injury  Outcome: Progressing     
  Problem: Chronic Conditions and Co-morbidities  Goal: Patient's chronic conditions and co-morbidity symptoms are monitored and maintained or improved  Outcome: Progressing     Problem: Discharge Planning  Goal: Discharge to home or other facility with appropriate resources  Outcome: Progressing     Problem: Pain  Goal: Verbalizes/displays adequate comfort level or baseline comfort level  Outcome: Progressing     Problem: ABCDS Injury Assessment  Goal: Absence of physical injury  Outcome: Progressing     Problem: Safety - Adult  Goal: Free from fall injury  Outcome: Progressing     Problem: Skin/Tissue Integrity  Goal: Skin integrity remains intact  Description: 1.  Monitor for areas of redness and/or skin breakdown2.  Assess vascular access sites hourly3.  Every 4-6 hours minimum:  Change oxygen saturation probe site4.  Every 4-6 hours:  If on nasal continuous positive airway pressure, respiratory therapy assess nares and determine need for appliance change or resting period  Outcome: Progressing     Problem: Genitourinary - Adult  Goal: Urinary catheter remains patent  Outcome: Progressing     
  Problem: Chronic Conditions and Co-morbidities  Goal: Patient's chronic conditions and co-morbidity symptoms are monitored and maintained or improved  Outcome: Progressing     Problem: Discharge Planning  Goal: Discharge to home or other facility with appropriate resources  Outcome: Progressing     Problem: Pain  Goal: Verbalizes/displays adequate comfort level or baseline comfort level  Outcome: Progressing     Problem: ABCDS Injury Assessment  Goal: Absence of physical injury  Outcome: Progressing     Problem: Safety - Adult  Goal: Free from fall injury  Outcome: Progressing     Problem: Skin/Tissue Integrity  Goal: Skin integrity remains intact  Description: 1.  Monitor for areas of redness and/or skin breakdown2.  Assess vascular access sites hourly3.  Every 4-6 hours minimum:  Change oxygen saturation probe site4.  Every 4-6 hours:  If on nasal continuous positive airway pressure, respiratory therapy assess nares and determine need for appliance change or resting period  Outcome: Progressing     Problem: Genitourinary - Adult  Goal: Urinary catheter remains patent  Outcome: Progressing     
  Problem: Chronic Conditions and Co-morbidities  Goal: Patient's chronic conditions and co-morbidity symptoms are monitored and maintained or improved  Outcome: Progressing  Flowsheets (Taken 6/1/2025 0356)  Care Plan - Patient's Chronic Conditions and Co-Morbidity Symptoms are Monitored and Maintained or Improved:   Monitor and assess patient's chronic conditions and comorbid symptoms for stability, deterioration, or improvement   Collaborate with multidisciplinary team to address chronic and comorbid conditions and prevent exacerbation or deterioration   Update acute care plan with appropriate goals if chronic or comorbid symptoms are exacerbated and prevent overall improvement and discharge     Problem: Discharge Planning  Goal: Discharge to home or other facility with appropriate resources  Outcome: Progressing  Flowsheets (Taken 6/1/2025 0356)  Discharge to home or other facility with appropriate resources:   Identify barriers to discharge with patient and caregiver   Identify discharge learning needs (meds, wound care, etc)   Refer to discharge planning if patient needs post-hospital services based on physician order or complex needs related to functional status, cognitive ability or social support system   Arrange for needed discharge resources and transportation as appropriate     Problem: Pain  Goal: Verbalizes/displays adequate comfort level or baseline comfort level  Outcome: Progressing  Flowsheets (Taken 6/1/2025 0356)  Verbalizes/displays adequate comfort level or baseline comfort level:   Encourage patient to monitor pain and request assistance   Administer analgesics based on type and severity of pain and evaluate response   Consider cultural and social influences on pain and pain management   Implement non-pharmacological measures as appropriate and evaluate response   Assess pain using appropriate pain scale   Notify Licensed Independent Practitioner if interventions unsuccessful or patient 
  Problem: Chronic Conditions and Co-morbidities  Goal: Patient's chronic conditions and co-morbidity symptoms are monitored and maintained or improved  Outcome: Progressing  Flowsheets (Taken 6/2/2025 0243)  Care Plan - Patient's Chronic Conditions and Co-Morbidity Symptoms are Monitored and Maintained or Improved:   Collaborate with multidisciplinary team to address chronic and comorbid conditions and prevent exacerbation or deterioration   Monitor and assess patient's chronic conditions and comorbid symptoms for stability, deterioration, or improvement   Update acute care plan with appropriate goals if chronic or comorbid symptoms are exacerbated and prevent overall improvement and discharge     Problem: Discharge Planning  Goal: Discharge to home or other facility with appropriate resources  Outcome: Progressing  Flowsheets (Taken 6/2/2025 0243)  Discharge to home or other facility with appropriate resources:   Identify barriers to discharge with patient and caregiver   Identify discharge learning needs (meds, wound care, etc)   Refer to discharge planning if patient needs post-hospital services based on physician order or complex needs related to functional status, cognitive ability or social support system   Arrange for needed discharge resources and transportation as appropriate     Problem: Pain  Goal: Verbalizes/displays adequate comfort level or baseline comfort level  Outcome: Progressing  Flowsheets (Taken 6/2/2025 0243)  Verbalizes/displays adequate comfort level or baseline comfort level:   Encourage patient to monitor pain and request assistance   Administer analgesics based on type and severity of pain and evaluate response   Consider cultural and social influences on pain and pain management   Assess pain using appropriate pain scale   Implement non-pharmacological measures as appropriate and evaluate response     Problem: Safety - Adult  Goal: Free from fall injury  Outcome: Progressing  Flowsheets 
  Problem: Chronic Conditions and Co-morbidities  Goal: Patient's chronic conditions and co-morbidity symptoms are monitored and maintained or improved  Outcome: Progressing  Flowsheets (Taken 6/3/2025 1729)  Care Plan - Patient's Chronic Conditions and Co-Morbidity Symptoms are Monitored and Maintained or Improved: Monitor and assess patient's chronic conditions and comorbid symptoms for stability, deterioration, or improvement     Problem: Discharge Planning  Goal: Discharge to home or other facility with appropriate resources  Outcome: Progressing  Flowsheets (Taken 6/3/2025 1729)  Discharge to home or other facility with appropriate resources:   Identify barriers to discharge with patient and caregiver   Identify discharge learning needs (meds, wound care, etc)     Problem: Pain  Goal: Verbalizes/displays adequate comfort level or baseline comfort level  Outcome: Progressing  Flowsheets (Taken 6/3/2025 1729)  Verbalizes/displays adequate comfort level or baseline comfort level:   Encourage patient to monitor pain and request assistance   Assess pain using appropriate pain scale   Administer analgesics based on type and severity of pain and evaluate response   Implement non-pharmacological measures as appropriate and evaluate response   Notify Licensed Independent Practitioner if interventions unsuccessful or patient reports new pain     Problem: ABCDS Injury Assessment  Goal: Absence of physical injury  Outcome: Progressing  Flowsheets (Taken 6/3/2025 1729)  Absence of Physical Injury: Implement safety measures based on patient assessment     Problem: Safety - Adult  Goal: Free from fall injury  Outcome: Progressing  Flowsheets (Taken 6/3/2025 1729)  Free From Fall Injury: Instruct family/caregiver on patient safety  Note: Patient in lowest position, bed breaks locked, bed alarm in placed, call light within reach, and encouraged to call for assistance.      Problem: Skin/Tissue Integrity  Goal: Skin integrity 
  Problem: Discharge Planning  Goal: Discharge to home or other facility with appropriate resources  6/4/2025 0943 by Keshia Colorado RN  Outcome: Progressing     Problem: Pain  Goal: Verbalizes/displays adequate comfort level or baseline comfort level  6/4/2025 0943 by Keshia Colorado, RN  Outcome: Progressing     Problem: Safety - Adult  Goal: Free from fall injury  6/4/2025 0943 by Keshia Colorado, RN  Outcome: Progressing     
  Problem: Pain  Goal: Verbalizes/displays adequate comfort level or baseline comfort level  6/5/2025 0942 by Keshia Colorado, RN  Outcome: Not Progressing       Problem: Discharge Planning  Goal: Discharge to home or other facility with appropriate resources  6/5/2025 0942 by Keshia Colorado, RN  Outcome: Progressing     Problem: Safety - Adult  Goal: Free from fall injury  6/5/2025 0942 by Keshia Colorado, RN  Outcome: Progressing     
Progressing  Flowsheets (Taken 6/4/2025 0024)  Verbalizes/displays adequate comfort level or baseline comfort level:   Encourage patient to monitor pain and request assistance   Assess pain using appropriate pain scale   Administer analgesics based on type and severity of pain and evaluate response   Implement non-pharmacological measures as appropriate and evaluate response  6/3/2025 1729 by Nelida Yost RN  Outcome: Progressing  Flowsheets (Taken 6/3/2025 1729)  Verbalizes/displays adequate comfort level or baseline comfort level:   Encourage patient to monitor pain and request assistance   Assess pain using appropriate pain scale   Administer analgesics based on type and severity of pain and evaluate response   Implement non-pharmacological measures as appropriate and evaluate response   Notify Licensed Independent Practitioner if interventions unsuccessful or patient reports new pain     Problem: ABCDS Injury Assessment  Goal: Absence of physical injury  6/4/2025 0024 by Katie Ramsey RN  Outcome: Progressing  Flowsheets (Taken 6/4/2025 0024)  Absence of Physical Injury: Implement safety measures based on patient assessment  6/3/2025 1729 by Nelida Yost RN  Outcome: Progressing  Flowsheets (Taken 6/3/2025 1729)  Absence of Physical Injury: Implement safety measures based on patient assessment     Problem: Safety - Adult  Goal: Free from fall injury  6/4/2025 0024 by Katie Ramsey RN  Outcome: Progressing  Flowsheets (Taken 6/4/2025 0024)  Free From Fall Injury:   Instruct family/caregiver on patient safety   Based on caregiver fall risk screen, instruct family/caregiver to ask for assistance with transferring infant if caregiver noted to have fall risk factors  6/3/2025 1729 by Nelida Yost RN  Outcome: Progressing  Flowsheets (Taken 6/3/2025 1729)  Free From Fall Injury: Instruct family/caregiver on patient safety  Note: Patient in lowest position, bed breaks locked, bed alarm in placed, call 
caregiver   Identify discharge learning needs (meds, wound care, etc)   Arrange for interpreters to assist at discharge as needed     Problem: Pain  Goal: Verbalizes/displays adequate comfort level or baseline comfort level  6/3/2025 0027 by Katie Ramsey RN  Outcome: Progressing  Flowsheets (Taken 6/3/2025 0027)  Verbalizes/displays adequate comfort level or baseline comfort level:   Encourage patient to monitor pain and request assistance   Assess pain using appropriate pain scale   Administer analgesics based on type and severity of pain and evaluate response   Implement non-pharmacological measures as appropriate and evaluate response  6/2/2025 1231 by Angel Butler RN  Outcome: Progressing  Flowsheets (Taken 6/2/2025 0752)  Verbalizes/displays adequate comfort level or baseline comfort level: Encourage patient to monitor pain and request assistance     Problem: ABCDS Injury Assessment  Goal: Absence of physical injury  6/3/2025 0027 by Katie Ramsey RN  Outcome: Progressing  Flowsheets (Taken 6/3/2025 0027)  Absence of Physical Injury: Implement safety measures based on patient assessment  6/2/2025 1231 by Angel Butler RN  Outcome: Progressing     Problem: Safety - Adult  Goal: Free from fall injury  6/3/2025 0027 by Katie Ramsey RN  Outcome: Progressing  Flowsheets (Taken 6/3/2025 0027)  Free From Fall Injury:   Instruct family/caregiver on patient safety   Based on caregiver fall risk screen, instruct family/caregiver to ask for assistance with transferring infant if caregiver noted to have fall risk factors  6/2/2025 1231 by Angel Butler RN  Outcome: Progressing

## 2025-06-07 ENCOUNTER — HOSPITAL ENCOUNTER (EMERGENCY)
Age: 50
Discharge: HOME | End: 2025-06-08
Attending: EMERGENCY MEDICINE
Payer: MEDICAID

## 2025-06-07 DIAGNOSIS — D50.0 IRON DEFICIENCY ANEMIA DUE TO CHRONIC BLOOD LOSS: ICD-10-CM

## 2025-06-07 DIAGNOSIS — C67.9 BLADDER CANCER METASTASIZED TO LUNG (HCC): ICD-10-CM

## 2025-06-07 DIAGNOSIS — R31.0 GROSS HEMATURIA: ICD-10-CM

## 2025-06-07 DIAGNOSIS — N39.0 ACUTE UTI: ICD-10-CM

## 2025-06-07 DIAGNOSIS — T50.901A ACCIDENTAL DRUG OVERDOSE, INITIAL ENCOUNTER: Primary | ICD-10-CM

## 2025-06-07 DIAGNOSIS — C78.00 BLADDER CANCER METASTASIZED TO LUNG (HCC): ICD-10-CM

## 2025-06-07 DIAGNOSIS — F19.10 POLYSUBSTANCE ABUSE (HCC): ICD-10-CM

## 2025-06-07 PROCEDURE — 80179 DRUG ASSAY SALICYLATE: CPT

## 2025-06-07 PROCEDURE — 82077 ASSAY SPEC XCP UR&BREATH IA: CPT

## 2025-06-07 PROCEDURE — 84484 ASSAY OF TROPONIN QUANT: CPT

## 2025-06-07 PROCEDURE — 80143 DRUG ASSAY ACETAMINOPHEN: CPT

## 2025-06-07 PROCEDURE — 93005 ELECTROCARDIOGRAM TRACING: CPT | Performed by: EMERGENCY MEDICINE

## 2025-06-07 PROCEDURE — 99284 EMERGENCY DEPT VISIT MOD MDM: CPT

## 2025-06-07 PROCEDURE — 80053 COMPREHEN METABOLIC PANEL: CPT

## 2025-06-07 PROCEDURE — 85025 COMPLETE CBC W/AUTO DIFF WBC: CPT

## 2025-06-07 RX ORDER — NALOXONE HYDROCHLORIDE 0.4 MG/ML
0.4 INJECTION, SOLUTION INTRAMUSCULAR; INTRAVENOUS; SUBCUTANEOUS ONCE
Status: COMPLETED | OUTPATIENT
Start: 2025-06-08 | End: 2025-06-08

## 2025-06-07 RX ORDER — 0.9 % SODIUM CHLORIDE 0.9 %
1000 INTRAVENOUS SOLUTION INTRAVENOUS ONCE
Status: COMPLETED | OUTPATIENT
Start: 2025-06-08 | End: 2025-06-08

## 2025-06-07 ASSESSMENT — PAIN - FUNCTIONAL ASSESSMENT: PAIN_FUNCTIONAL_ASSESSMENT: NONE - DENIES PAIN

## 2025-06-08 VITALS
RESPIRATION RATE: 21 BRPM | HEART RATE: 91 BPM | DIASTOLIC BLOOD PRESSURE: 79 MMHG | BODY MASS INDEX: 21.12 KG/M2 | TEMPERATURE: 97.4 F | SYSTOLIC BLOOD PRESSURE: 120 MMHG | OXYGEN SATURATION: 97 % | WEIGHT: 151.4 LBS

## 2025-06-08 LAB
ALBUMIN SERPL-MCNC: 3.7 G/DL (ref 3.4–5)
ALBUMIN/GLOB SERPL: 1.2 {RATIO} (ref 1.1–2.2)
ALP SERPL-CCNC: 86 U/L (ref 40–129)
ALT SERPL-CCNC: 12 U/L (ref 10–40)
AMORPH SED URNS QL MICRO: ABNORMAL /HPF
AMPHETAMINES UR QL SCN>1000 NG/ML: ABNORMAL
ANION GAP SERPL CALCULATED.3IONS-SCNC: 9 MMOL/L (ref 3–16)
APAP SERPL-MCNC: <5 UG/ML (ref 10–30)
AST SERPL-CCNC: 21 U/L (ref 15–37)
BACTERIA URNS QL MICRO: ABNORMAL /HPF
BARBITURATES UR QL SCN>200 NG/ML: ABNORMAL
BASE EXCESS BLDV CALC-SCNC: 3.4 MMOL/L (ref -3–3)
BASOPHILS # BLD: 0.1 K/UL (ref 0–0.2)
BASOPHILS NFR BLD: 0.7 %
BENZODIAZ UR QL SCN>200 NG/ML: POSITIVE
BILIRUB SERPL-MCNC: <0.2 MG/DL (ref 0–1)
BILIRUB UR QL STRIP.AUTO: NEGATIVE
BUN SERPL-MCNC: 23 MG/DL (ref 7–20)
CALCIUM SERPL-MCNC: 9.1 MG/DL (ref 8.3–10.6)
CANNABINOIDS UR QL SCN>50 NG/ML: ABNORMAL
CHLORIDE SERPL-SCNC: 98 MMOL/L (ref 99–110)
CLARITY UR: ABNORMAL
CO2 BLDV-SCNC: 27 MMOL/L
CO2 SERPL-SCNC: 27 MMOL/L (ref 21–32)
COCAINE UR QL SCN: ABNORMAL
COLOR UR: ABNORMAL
CREAT SERPL-MCNC: 1.3 MG/DL (ref 0.9–1.3)
DEPRECATED RDW RBC AUTO: 31.2 % (ref 12.4–15.4)
DRUG SCREEN COMMENT UR-IMP: ABNORMAL
EKG ATRIAL RATE: 61 BPM
EKG DIAGNOSIS: NORMAL
EKG P AXIS: 18 DEGREES
EKG P-R INTERVAL: 152 MS
EKG Q-T INTERVAL: 422 MS
EKG QRS DURATION: 88 MS
EKG QTC CALCULATION (BAZETT): 424 MS
EKG R AXIS: 73 DEGREES
EKG T AXIS: 42 DEGREES
EKG VENTRICULAR RATE: 61 BPM
EOSINOPHIL # BLD: 0.3 K/UL (ref 0–0.6)
EOSINOPHIL NFR BLD: 3.5 %
EPI CELLS #/AREA URNS HPF: ABNORMAL /HPF (ref 0–5)
ETHANOLAMINE SERPL-MCNC: NORMAL MG/DL (ref 0–0.08)
FENTANYL SCREEN, URINE: POSITIVE
GFR SERPLBLD CREATININE-BSD FMLA CKD-EPI: 67 ML/MIN/{1.73_M2}
GLUCOSE SERPL-MCNC: 98 MG/DL (ref 70–99)
GLUCOSE UR STRIP.AUTO-MCNC: NEGATIVE MG/DL
HCO3 BLDV-SCNC: 28 MMOL/L (ref 23–29)
HCT VFR BLD AUTO: 25.8 % (ref 40.5–52.5)
HCT VFR BLD AUTO: 26 % (ref 40.5–52.5)
HGB BLD-MCNC: 7.9 G/DL (ref 13.5–17.5)
HGB BLD-MCNC: 7.9 G/DL (ref 13.5–17.5)
HGB UR QL STRIP.AUTO: ABNORMAL
KETONES UR STRIP.AUTO-MCNC: NEGATIVE MG/DL
LACTATE BLDV-SCNC: 1.3 MMOL/L (ref 0.4–1.9)
LEUKOCYTE ESTERASE UR QL STRIP.AUTO: ABNORMAL
LYMPHOCYTES # BLD: 1.5 K/UL (ref 1–5.1)
LYMPHOCYTES NFR BLD: 15.7 %
MCH RBC QN AUTO: 21 PG (ref 26–34)
MCHC RBC AUTO-ENTMCNC: 30.8 G/DL (ref 31–36)
MCV RBC AUTO: 68.2 FL (ref 80–100)
METHADONE UR QL SCN>300 NG/ML: ABNORMAL
MONOCYTES # BLD: 0.9 K/UL (ref 0–1.3)
MONOCYTES NFR BLD: 9.1 %
MUCOUS THREADS #/AREA URNS LPF: ABNORMAL /LPF
NEUTROPHILS # BLD: 6.8 K/UL (ref 1.7–7.7)
NEUTROPHILS NFR BLD: 71 %
NITRITE UR QL STRIP.AUTO: NEGATIVE
O2 THERAPY: ABNORMAL
OPIATES UR QL SCN>300 NG/ML: POSITIVE
OXYCODONE UR QL SCN: POSITIVE
PCO2 BLDV: 43.8 MMHG (ref 40–50)
PCP UR QL SCN>25 NG/ML: ABNORMAL
PH BLDV: 7.41 [PH] (ref 7.35–7.45)
PH UR STRIP.AUTO: 6 [PH] (ref 5–8)
PH UR STRIP: 6 [PH]
PLATELET # BLD AUTO: 410 K/UL (ref 135–450)
PMV BLD AUTO: 9.2 FL (ref 5–10.5)
PO2 BLDV: 55.9 MMHG (ref 25–40)
POTASSIUM SERPL-SCNC: 4.3 MMOL/L (ref 3.5–5.1)
PROT SERPL-MCNC: 6.9 G/DL (ref 6.4–8.2)
PROT UR STRIP.AUTO-MCNC: >=300 MG/DL
RBC # BLD AUTO: 3.78 M/UL (ref 4.2–5.9)
RBC #/AREA URNS HPF: >100 /HPF (ref 0–4)
SALICYLATES SERPL-MCNC: <0.5 MG/DL (ref 15–30)
SAO2 % BLDV: 89 %
SODIUM SERPL-SCNC: 134 MMOL/L (ref 136–145)
SP GR UR STRIP.AUTO: 1.02 (ref 1–1.03)
TROPONIN, HIGH SENSITIVITY: 21 NG/L (ref 0–22)
UA COMPLETE W REFLEX CULTURE PNL UR: YES
UA DIPSTICK W REFLEX MICRO PNL UR: YES
URN SPEC COLLECT METH UR: ABNORMAL
UROBILINOGEN UR STRIP-ACNC: 1 E.U./DL
WBC # BLD AUTO: 9.5 K/UL (ref 4–11)
WBC #/AREA URNS HPF: ABNORMAL /HPF (ref 0–5)

## 2025-06-08 PROCEDURE — 82803 BLOOD GASES ANY COMBINATION: CPT

## 2025-06-08 PROCEDURE — 96375 TX/PRO/DX INJ NEW DRUG ADDON: CPT

## 2025-06-08 PROCEDURE — 83605 ASSAY OF LACTIC ACID: CPT

## 2025-06-08 PROCEDURE — 85018 HEMOGLOBIN: CPT

## 2025-06-08 PROCEDURE — 2500000003 HC RX 250 WO HCPCS: Performed by: EMERGENCY MEDICINE

## 2025-06-08 PROCEDURE — 87086 URINE CULTURE/COLONY COUNT: CPT

## 2025-06-08 PROCEDURE — 80307 DRUG TEST PRSMV CHEM ANLYZR: CPT

## 2025-06-08 PROCEDURE — 93010 ELECTROCARDIOGRAM REPORT: CPT | Performed by: STUDENT IN AN ORGANIZED HEALTH CARE EDUCATION/TRAINING PROGRAM

## 2025-06-08 PROCEDURE — 2580000003 HC RX 258: Performed by: EMERGENCY MEDICINE

## 2025-06-08 PROCEDURE — 6360000002 HC RX W HCPCS: Performed by: EMERGENCY MEDICINE

## 2025-06-08 PROCEDURE — 96374 THER/PROPH/DIAG INJ IV PUSH: CPT

## 2025-06-08 PROCEDURE — 81001 URINALYSIS AUTO W/SCOPE: CPT

## 2025-06-08 PROCEDURE — 85014 HEMATOCRIT: CPT

## 2025-06-08 RX ORDER — CEFUROXIME AXETIL 250 MG/1
250 TABLET ORAL 2 TIMES DAILY
Qty: 14 TABLET | Refills: 0 | Status: SHIPPED | OUTPATIENT
Start: 2025-06-08 | End: 2025-06-15

## 2025-06-08 RX ADMIN — SODIUM CHLORIDE 1000 ML: 0.9 INJECTION, SOLUTION INTRAVENOUS at 00:10

## 2025-06-08 RX ADMIN — NALXONE HYDROCHLORIDE 0.4 MG: 0.4 INJECTION INTRAMUSCULAR; INTRAVENOUS; SUBCUTANEOUS at 00:10

## 2025-06-08 RX ADMIN — WATER 1000 MG: 1 INJECTION INTRAMUSCULAR; INTRAVENOUS; SUBCUTANEOUS at 02:30

## 2025-06-08 NOTE — ED PROVIDER NOTES
Acute UTI    5. Gross hematuria    6. Iron deficiency anemia due to chronic blood loss        Dispo:  Patient will be discharged at this time. Patient was informed of this decision and agrees with plan. I have discussed lab and xray findings with patient and they understand. Questions were answered to the best of my ability.      Followup plan:  Get Dominguez MD  [address]  [address]  [facility] [address] [phone]    Schedule an appointment as soon as possible for a visit       Constance Lange MD  [address] [facility] [address] [phone]    Schedule an appointment as soon as possible for a visit         Discharge vitals:  Blood pressure 120/79, pulse 91, temperature 97.4 °F (36.3 °C), temperature source Oral, resp. rate 21, weight 68.7 kg (151 lb 6.4 oz), SpO2 97%.    Prescriptions given:   New Prescriptions    CEFUROXIME (CEFTIN) 250 MG TABLET    Take 1 tablet by mouth 2 times daily for 7 days       I personally saw the patient and independently provided 0 minutes of non-concurrent critical care out of the total shared critical care time provided.    This chart was created using Dragon voice recognition software.        Tori Carranza MD  06/08/25 0684

## 2025-06-08 NOTE — DISCHARGE INSTRUCTIONS
Avoid all drugs and alcohol. Drink plenty of fluids. Take the antibiotics as prescribed for urinary infection. It is very important that you followup with [facility] and [facility] specialists as soon as possible. Return for fever, increased abdominal or flank pain, inability to urinate, dizziness or fainting, persistent heavy bleeding in urine, trouble breathing or other worsening symptoms.

## 2025-06-08 NOTE — ED NOTES
Pt ambulated. Pt also has a w/c that he scoots around in. Sent in cab to address provided by him. Pt couldn't find cell phone or blanket at discharge. All belongings brought in by EMS were sent home with patient.

## 2025-06-09 ENCOUNTER — HOSPITAL ENCOUNTER (EMERGENCY)
Age: 50
Discharge: HOME | End: 2025-06-10
Attending: EMERGENCY MEDICINE

## 2025-06-09 DIAGNOSIS — T50.901A ACCIDENTAL DRUG OVERDOSE, INITIAL ENCOUNTER: Primary | ICD-10-CM

## 2025-06-09 LAB — BACTERIA UR CULT: NORMAL

## 2025-06-09 ASSESSMENT — PAIN SCALES - GENERAL: PAINLEVEL_OUTOF10: 8

## 2025-06-09 ASSESSMENT — PAIN - FUNCTIONAL ASSESSMENT: PAIN_FUNCTIONAL_ASSESSMENT: 0-10

## 2025-06-09 ASSESSMENT — PAIN DESCRIPTION - LOCATION: LOCATION: BACK

## 2025-06-09 ASSESSMENT — LIFESTYLE VARIABLES
HOW MANY STANDARD DRINKS CONTAINING ALCOHOL DO YOU HAVE ON A TYPICAL DAY: PATIENT DOES NOT DRINK
HOW OFTEN DO YOU HAVE A DRINK CONTAINING ALCOHOL: NEVER

## 2025-06-09 ASSESSMENT — PAIN DESCRIPTION - DESCRIPTORS: DESCRIPTORS: DISCOMFORT

## 2025-06-10 VITALS
BODY MASS INDEX: 21.85 KG/M2 | DIASTOLIC BLOOD PRESSURE: 87 MMHG | HEIGHT: 71 IN | TEMPERATURE: 98.8 F | HEART RATE: 90 BPM | SYSTOLIC BLOOD PRESSURE: 114 MMHG | OXYGEN SATURATION: 96 % | WEIGHT: 156.1 LBS | RESPIRATION RATE: 18 BRPM

## 2025-06-10 ASSESSMENT — PAIN DESCRIPTION - DESCRIPTORS: DESCRIPTORS: ACHING

## 2025-06-10 ASSESSMENT — PAIN SCALES - GENERAL: PAINLEVEL_OUTOF10: 8

## 2025-06-10 ASSESSMENT — PAIN - FUNCTIONAL ASSESSMENT: PAIN_FUNCTIONAL_ASSESSMENT: 0-10

## 2025-06-10 ASSESSMENT — PAIN DESCRIPTION - LOCATION: LOCATION: BACK

## 2025-06-10 NOTE — DISCHARGE INSTRUCTIONS
critical medical care on the way to the emergency department while in the ambulance.    Even if not listed above, you should always call your Primary Care Provider after a visit to the Emergency Department. They will want to know what your emergency was so they can best figure out how to continue to coordinate your care moving forward. Your Primary Care Provider is responsible for coordinating and tracking your health and medical care. You should keep them informed regularly of any and all new medical conditions, changes to your medications or other information pertinent to your health.    DISCHARGE MEDICATIONS:  The following medications were prescribed for the you during this visit. Take them as directed below:     New Prescriptions    No medications on file       These home medications were modified or discontinued during this visit (be sure you discuss these modifications with your primary care or prescribing physician as soon as possible):   Modified Medications    No medications on file     Discontinued Medications    No medications on file        You should continue to take the following home medications as prescribed by your physician:   Previous Medications    CEFUROXIME (CEFTIN) 250 MG TABLET    Take 1 tablet by mouth 2 times daily for 7 days    TRAMADOL (ULTRAM) 50 MG TABLET    Take 1 tablet by mouth every 6 hours as needed for Pain for up to 7 days. Max Daily Amount: 200 mg       Additional important information has been included within this packet, please make sure that you have read this information as it will better help you understand your illness/injury better, the danger signs to watch for and things you can do to improve your condition and health in the future.

## 2025-06-10 NOTE — ED PROVIDER NOTES
06/09/25    This patient was seen by the advanced practice provider. I have seen and examined the patient, agree with the workup, evaluation, management and diagnosis. Care plan has been discussed.      Assessment reveals 49 y.o. male in ED with complaints of concerns for accidental drug overdose.  On my examination he is disheveled.  Pupils are 3 mm and equal and reactive.  Has a normal respiratory rate and lungs are clear to auscultation bilaterally.  Heart is regular rate and rhythm and abdomen is soft and nontender.  Patient will be monitored in the emergency department for evidence of hypopnea.  Signed out to oncoming team pending observation and final disposition    Isaac Carlson MD  Emergency Medicine  [facility]      Isaac Carlson MD  06/09/25 [id number]

## 2025-07-03 ASSESSMENT — ENCOUNTER SYMPTOMS
COUGH: 0
VOMITING: 0
EYE PAIN: 0
RHINORRHEA: 0
ABDOMINAL PAIN: 0
ABDOMINAL DISTENTION: 0
DIARRHEA: 0
TROUBLE SWALLOWING: 0
WHEEZING: 0
SHORTNESS OF BREATH: 0
SORE THROAT: 0
CHEST TIGHTNESS: 0
COLOR CHANGE: 0
NAUSEA: 0

## (undated) DEVICE — CATHETER URETH 24FR BLLN 30CC STD LTX 3 W TWO OPP DRNGE EYE

## (undated) DEVICE — GARMENT,MEDLINE,DVT,INT,CALF,MED, GEN2: Brand: MEDLINE

## (undated) DEVICE — SOL IRR SOD CHL 0.9% TITAN XL CNTNR 3000ML

## (undated) DEVICE — SYRINGE MED 30ML STD CLR PLAS LUERLOCK TIP N CTRL DISP

## (undated) DEVICE — GLOVE ORANGE PI 7   MSG9070

## (undated) DEVICE — HF-RESECTION ELECTRODE PLASMALOOP LOOP, LARGE, 24 FR., 12°/16°, ESG TURIS: Brand: OLYMPUS

## (undated) DEVICE — GOWN,SIRUS,POLYRNF,BRTHSLV,XL,30/CS: Brand: MEDLINE

## (undated) DEVICE — CYSTO: Brand: MEDLINE INDUSTRIES, INC.

## (undated) DEVICE — TOWEL,STOP FLAG GOLD N-W: Brand: MEDLINE

## (undated) DEVICE — 60 ML SYRINGE,CATHETER TIP: Brand: MONOJECT

## (undated) DEVICE — SOLUTION ST WATER 1500ML W/H

## (undated) DEVICE — BASIC SINGLE BASIN 1-LF: Brand: MEDLINE INDUSTRIES, INC.